# Patient Record
Sex: FEMALE | Race: WHITE | URBAN - METROPOLITAN AREA
[De-identification: names, ages, dates, MRNs, and addresses within clinical notes are randomized per-mention and may not be internally consistent; named-entity substitution may affect disease eponyms.]

---

## 2017-03-08 ENCOUNTER — OFFICE VISIT (OUTPATIENT)
Dept: NEUROLOGY | Facility: CLINIC | Age: 27
End: 2017-03-08

## 2017-03-08 DIAGNOSIS — G71.11 MYOTONIC MUSCULAR DYSTROPHY (H): Primary | ICD-10-CM

## 2017-03-08 DIAGNOSIS — G71.11 MYOTONIC MUSCULAR DYSTROPHY (H): ICD-10-CM

## 2017-03-08 LAB — MISCELLANEOUS TEST: NORMAL

## 2017-03-08 NOTE — LETTER
3/8/2017       RE: Reny Christianson  [address] [facility] [address]     Dear Colleague,    Thank you for referring your patient, Reny Christianson, to the [facility] NEUROLOGY at [facility]. Please see a copy of my visit note below.    MDA GENETIC COUNSELING CONSULT  Reny was seen with her sister and mom for a genetic counseling consult at the request of Dr. Dumas to discuss genetic testing for myotonic dystrophy. 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    FAMILY HISTORY  A detailed family history was taken and scanned into Reny s medical record. Reny's family history is signifainct for:    Reny mother, maternal grandmother and mater aunt are all diagnosed with myotonic dystrophy.    Ethnic background is [place].    GENETIC COUNSELING  1. Myotonic dystrophy  is a condition that causes muscle weakness and myotonia (an inability of the muscles to relax). Other symptoms include difficulties swallowing, cataracts, cardiac conduction defects (heart racing or skipping a beat), stomach problems, diabetes, and some endocrine issues (such as male infertility). It is recommended that individuals with myotonic dystrophy have yearly diabetes and heart screening. The recommended heart screening is called an ECG, which is a test that looks for abnormal heart rhythms. Additionally, individuals with DM2 are advised to discuss their diagnosis with their doctor before having general anesthesia. We discussed the similarities and differences between myotonic dystrophy type 1 (DM1) and type 2 (DM2). Congenital myotonic dystrophy is ONLY associated with myotonic dystrophy type 1.  2. We reviewed that the 2 types of myotonic dystrophy differ by genetic cause.  Myotonic dystrophy type 1 (DM1) is caused by a CTG expansion in the DMPK gene located on chromosome 17.  Myotonic dystrophy type 2 (DM2) is caused by CCTG expansion in the ZNF9 gene located on  chromosome 3.  3. We discussed that myotonic dystrophy  is an autosomal dominant condition. Individuals that are affected with myotonic dystrophy have a one out of two of 50% chance of passing the gene or mutation change or mutation on to their children. The symptoms can vary from individual to individual therefore not everyone who inherits an myotonic dystrophy expansion has the same symptoms or age of onset.  4. Genetic Testing involves measuring the size of the repeat expansion. This test is essentially 100% accurate in determining if an individual has either a DM1 or DM2 expansion. We discussed that determining the expansion size will not tell us age of onset or severity of symptoms. It will allow us to confirm the diagnosis and allow for testing in other family members. We reviewed the plan for testing which includes first testing for myotonic dystrophy type 1 (DM1) .  5. Because a mutation has been identified in your family pre-symptomatic testing is an option.    The decision to have pre-symptomatic testing is a personal one.  People pursue testing for several different reasons such as life planning, family planning, reassurance and knowledge.  Individuals who are undergoing genetic testing sometimes have concerns about future insurability.  We discussed that there are national and state laws that protect against genetic discrimination in regards to health insurance.  Disability, long-term care and life insurance are not protected by similar laws.  In general presymptomatic testing is recommended to be done on adults or children that would benefit from preventative screening or treatment.  Regardless of if your family members elect to have testing, it is recommended that individuals at risk to develop symptoms of DM1 have regular ECGs.  6. All immediate questions were answered. My contact information was provided for additional questions in the future.  Sixty minutes was spent with the patient and more than  50% of the time was spent in counseling and coordination of care.     PLAN  1. Reny elected to proceed with  had her blood drawn on March 8, 2017  for myotonic dystrophy type 1 testing to be performed at [facility].  2. I will contact Reny with results, which I anticipate will take 4-6 weeks.    Giulia Park MS, [facility]  Genetic Counselor  Phone: [phone]      Again, thank you for allowing me to participate in the care of your patient.      Sincerely,    Giulia Park, GC

## 2017-03-08 NOTE — LETTER
Date:March 14, 2017      Patient was self referred, no letter generated. Do not send.        [facility] Health Information

## 2017-03-08 NOTE — PROGRESS NOTES
MDA GENETIC COUNSELING CONSULT  Reny was seen with her sister and mom for a genetic counseling consult at the request of Dr. Dumas to discuss genetic testing for myotonic dystrophy. 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    FAMILY HISTORY  A detailed family history was taken and scanned into Reny s medical record. Reny's family history is signifainct for:    Reny mother, maternal grandmother and mater aunt are all diagnosed with myotonic dystrophy.    Ethnic background is [place]og[place].    GENETIC COUNSELING  1. Myotonic dystrophy  is a condition that causes muscle weakness and myotonia (an inability of the muscles to relax). Other symptoms include difficulties swallowing, cataracts, cardiac conduction defects (heart racing or skipping a beat), stomach problems, diabetes, and some endocrine issues (such as male infertility). It is recommended that individuals with myotonic dystrophy have yearly diabetes and heart screening. The recommended heart screening is called an ECG, which is a test that looks for abnormal heart rhythms. Additionally, individuals with DM2 are advised to discuss their diagnosis with their doctor before having general anesthesia. We discussed the similarities and differences between myotonic dystrophy type 1 (DM1) and type 2 (DM2). Congenital myotonic dystrophy is ONLY associated with myotonic dystrophy type 1.  2. We reviewed that the 2 types of myotonic dystrophy differ by genetic cause.  Myotonic dystrophy type 1 (DM1) is caused by a CTG expansion in the DMPK gene located on chromosome 17.  Myotonic dystrophy type 2 (DM2) is caused by CCTG expansion in the ZNF9 gene located on chromosome 3.  3. We discussed that myotonic dystrophy  is an autosomal dominant condition. Individuals that are affected with myotonic dystrophy have a one out of two of 50% chance of passing the gene or mutation change or mutation on to their children. The symptoms can vary from  individual to individual therefore not everyone who inherits an myotonic dystrophy expansion has the same symptoms or age of onset.  4. Genetic Testing involves measuring the size of the repeat expansion. This test is essentially 100% accurate in determining if an individual has either a DM1 or DM2 expansion. We discussed that determining the expansion size will not tell us age of onset or severity of symptoms. It will allow us to confirm the diagnosis and allow for testing in other family members. We reviewed the plan for testing which includes first testing for myotonic dystrophy type 1 (DM1) .  5. Because a mutation has been identified in your family pre-symptomatic testing is an option.    The decision to have pre-symptomatic testing is a personal one.  People pursue testing for several different reasons such as life planning, family planning, reassurance and knowledge.  Individuals who are undergoing genetic testing sometimes have concerns about future insurability.  We discussed that there are national and state laws that protect against genetic discrimination in regards to health insurance.  Disability, long-term care and life insurance are not protected by similar laws.  In general presymptomatic testing is recommended to be done on adults or children that would benefit from preventative screening or treatment.  Regardless of if your family members elect to have testing, it is recommended that individuals at risk to develop symptoms of DM1 have regular ECGs.  6. All immediate questions were answered. My contact information was provided for additional questions in the future.  Sixty minutes was spent with the patient and more than 50% of the time was spent in counseling and coordination of care.     PLAN  1. Reny elected to proceed with  had her blood drawn on March 8, 2017  for myotonic dystrophy type 1 testing to be performed at [facility].  2. I will contact Reny with results, which I anticipate will take  4-6 weeks.    Giulia Park MS, [facility]  Genetic Counselor  Phone: [phone]

## 2017-03-08 NOTE — MR AVS SNAPSHOT
After Visit Summary   3/8/2017    Reny Christianson    MRN: [id number]           Patient Information     Date Of Birth          1990        Visit Information        Provider Department      3/8/2017 11:00 AM Giulia Park GC [facility] Neurology        Today's Diagnoses     Myotonic muscular dystrophy (H)    -  1       Follow-ups after your visit        Your next 10 appointments already scheduled     Jul 24, 2017  3:00 PM CDT   (Arrive by 2:45 PM)   New Muscular Dystrophy with Mick Ball MD   [facility]    [address] [place] [id number] [phone]            Jul 24, 2017  4:00 PM CDT   (Arrive by 3:45 PM)   New Muscular Dystrophy with Kevin Dumas MD   [facility] Neurology ([facility])    [address] [place] [id number] [phone]              Who to contact     Please call your clinic at [phone] to:    Ask questions about your health    Make or cancel appointments    Discuss your medicines    Learn about your test results    Speak to your doctor   If you have compliments or concerns about an experience at your clinic, or if you wish to file a complaint, please contact [facility] Patient Relations at [phone] or email us at [email or web address]         Additional Information About Your Visit        MyChart Information     [employer] gives you secure access to your electronic health record. If you see a primary care provider, you can also send messages to your care team and make appointments. If you have questions, please call your primary care clinic.  If you do not have a primary care provider, please call [phone] and they will assist you.      [employer] is an electronic gateway that provides easy, online access to your medical records. With [employer], you can request a clinic appointment, read  your test results, renew a prescription or communicate with your care team.     To access your existing account, please contact your [facility] or call [phone] for assistance.        Care EveryWhere ID     This is your Care EveryWhere ID. This could be used by other organizations to access your [place] medical records  [id number]         Blood Pressure from Last 3 Encounters:   No data found for BP    Weight from Last 3 Encounters:   No data found for Wt               Primary Care Provider    None Specified       No primary provider on file.        Thank you!     Thank you for choosing [facility] NEUROLOGY  for your care. Our goal is always to provide you with excellent care. Hearing back from our patients is one way we can continue to improve our services. Please take a few minutes to complete the written survey that you may receive in the mail after your visit with us. Thank you!             Your Updated Medication List - Protect others around you: Learn how to safely use, store and throw away your medicines at www.disposemymeds.org.      Notice  As of 3/8/2017  1:04 PM    You have not been prescribed any medications.

## 2017-03-20 ENCOUNTER — TELEPHONE (OUTPATIENT)
Dept: NEUROLOGY | Facility: CLINIC | Age: 27
End: 2017-03-20

## 2017-03-20 LAB — LAB SCANNED RESULT: NORMAL

## 2017-03-20 NOTE — Clinical Note
Please print and enclose scanned image under linked document in Send outs misc test [[id number]] (Order [id number]

## 2017-03-20 NOTE — LETTER
March 20, 2017       TO: Reny Christianson  [address] [facility] [address]       DearMs.Sammy,    I am writing to follow-up on our telephone conversation on March 20, 2017 regarding your genetic test results for myotonic dystrophy type 1 (DM1).  As we discussed this confirms the diagnosis of DM1.  Include his letter is a summary of what we discussed at your appointment and on the phone as well as a copy of your results.     SUMMARY OF GENETIC TESTING RESULTS  You had your blood drawn on March 8, 2017 for myotonic dystrophy type 1 (DM1) genetic testing.  This testing detected the CTG expansion associated with myotonic dystrophy type 1 (DM1).  Specifically, the size of the expansion was estimated to be 7167-2848 CTG repeats (normal allele: 11 CTG repeats).  This confirms the diagnosis of myotonic dystrophy type 1 (DM1).    The gene that is involved in DM1 has a CTG repeat.  If the repeat is larger than 100 CTG repeats we expect individuals to display some symptoms of DM1.       SYMPTOMS OF MYOTONIC DYSTROPHY  Myotonic dystrophy is a condition that causes muscle weakness and myotonia (inability of muscles to relax after use).  Myotonic dystrophy is a multi-systemic disease meaning that besides the muscle it affects several different organs is the body.  Individuals with myotonic dystrophy can have cataracts, stomach problems, diabetes, thyroid problems and heart problems.  It is recommended that an individual with a myotonic dystrophy expansion have a yearly EKG, which is a test used to monitor the heart.  Additionally, individuals with a myotonic dystrophy expansion are advised to discuss their diagnosis with their Doctor before having general anesthesia.     There is a more severe form of myotonic dystrophy type 1, called congenital myotonic dystrophy type 1.  In congenital myotonic dystrophy type 1 symptoms are present at birth.  Babies with congenital myotonic dystrophy type 1 typically have very  little muscle tone and are sometimes described as floppy.  Because the muscles are not well developed these babies typically have difficulty feeding and breathing on their own.  Infants that survive past the first few year of life typically have progressive muscle weakness and some degree of learning difficulties or mental retardation.     GENETICS OF MYOTONIC DYSTROPHY TYPE 1  It is generally thought the larger the repeat the more severe the condition, including an earlier age when symptoms start appearing.  This being said the expansion size does not provide much prognostic information.  The repeat is very unstable and may be different sizes in different tissues.  The only repeat size we measure routinely is in the blood.  The size of repeats can become larger when they are passed on to children, and so each generation can show earlier and earlier ages of onset.  Because the size of the repeat becomes larger so can the severity of the condition.   We also discussed that the size of the repeat tends is not as likely to expand when passed on by the father, however there are reports of congenital DM1 inherited from the father.  Most infants with congenital DM1 have greater 1000 repeats.      INHERITANCE OF MYOTONIC DYSTROPHY TYPE 1 (DM1)  DM1 is an autosomal dominant condition.  Autosomal means that gene is not located on the sex chromosomes; therefore both male and females can develop symptoms.  Dominant means that you only need one changed gene to have DM1.  Individuals that have a DM1 expansion have 1 out of 2 or 50 percent chance of passing the gene change on to their children. Because you posses the DM1 CTG expansion, there is 1 out of 2 (50%) chance, in each pregnancy, to pass on the CTG expansion.      PRE-SYMPTOMATIC GENETIC TESTING FOR MYOTONIC DYSTROPHY TYPE 1 (DM1)  Because a DM1 expansion has been identified in your family pre-symptomatic testing is an option.    The decision to have pre-symptomatic  testing is a personal one.  People pursue testing for several different reasons such as life planning, family planning, reassurance and knowledge.  Individuals who are undergoing genetic testing sometimes have concerns about future insurability.  We discussed that there are national and state laws that protect against genetic discrimination in regards to health insurance.  Disability and life insurance are not protected by similar laws.      In general presymptomatic testing is recommended to be done on adults or children that would benefit from preventative screening or treatment.  Regardless of if your family members elect to have testing, it is recommended that individuals at risk to develop symptoms of have cardiac screening for cardiac conduction defects.      It has been a pleasure being involved in your care.  If I can be helpful please don't hesitate to contact me.    Sincerely,    Giulia Park MS, [facility]  Genetic Counselor  Phone: [phone]          Resulted Orders   Myotonic dystrophy type 1: Laboratory Miscellaneous Order   Result Value Ref Range    Miscellaneous Test       Specimen Received, Reordered and sent to Performing laboratory - Report to   follow upon completion.     Send outs misc test   Result Value Ref Range    Lab Scanned Result SEND OUTS MISC TEST-Scanned        Enc: scanned image of Send outs misc test [[id number]] (Order [id number]

## 2017-03-20 NOTE — TELEPHONE ENCOUNTER
Called to discuss results confirming diagnosis of myotonic dystrophy type 1 (DM1). A results letter will be mailed (see letters).    Giulia Park MS, [facility]  Genetic Counselor  Phone: [phone]

## 2017-07-10 ASSESSMENT — ENCOUNTER SYMPTOMS
INCREASED ENERGY: 0
NIGHT SWEATS: 0
STIFFNESS: 0
CHILLS: 0
WEIGHT LOSS: 0
DECREASED APPETITE: 0
HALLUCINATIONS: 0
FEVER: 0
MUSCLE WEAKNESS: 1
MUSCLE CRAMPS: 1
WEIGHT GAIN: 1
ALTERED TEMPERATURE REGULATION: 0
BACK PAIN: 0
POLYDIPSIA: 0
POLYPHAGIA: 1
JOINT SWELLING: 0
MYALGIAS: 1
FATIGUE: 1
NECK PAIN: 0
ARTHRALGIAS: 1

## 2017-07-17 ENCOUNTER — PRE VISIT (OUTPATIENT)
Dept: NEUROLOGY | Facility: CLINIC | Age: 27
End: 2017-07-17

## 2017-07-17 NOTE — TELEPHONE ENCOUNTER
1.  Date/reason for appt:7/24/17, Muscular Dystrophy   2.  Referring provider:PAM DIAZ  3.  Call to patient (Yes / No - short description):No, referred   4.  Previous care at / records requested from:   [facility]  5.  Other:

## 2017-07-18 ENCOUNTER — PRE VISIT (OUTPATIENT)
Dept: CARDIOLOGY | Facility: CLINIC | Age: 27
End: 2017-07-18

## 2017-07-18 DIAGNOSIS — G71.11 MYOTONIC DYSTROPHY, TYPE 1 (H): Primary | ICD-10-CM

## 2017-07-18 DIAGNOSIS — R00.2 PALPITATIONS: ICD-10-CM

## 2017-07-24 ENCOUNTER — RESEARCH ENCOUNTER (OUTPATIENT)
Dept: NEUROLOGY | Facility: CLINIC | Age: 27
End: 2017-07-24

## 2017-07-24 ENCOUNTER — OFFICE VISIT (OUTPATIENT)
Dept: NEUROLOGY | Facility: CLINIC | Age: 27
End: 2017-07-24

## 2017-07-24 ENCOUNTER — HOSPITAL ENCOUNTER (OUTPATIENT)
Dept: MRI IMAGING | Facility: CLINIC | Age: 27
Discharge: HOME | End: 2017-07-24
Attending: INTERNAL MEDICINE | Admitting: INTERNAL MEDICINE
Payer: MEDICARE

## 2017-07-24 ENCOUNTER — OFFICE VISIT (OUTPATIENT)
Dept: CARDIOLOGY | Facility: CLINIC | Age: 27
End: 2017-07-24
Attending: INTERNAL MEDICINE
Payer: MEDICARE

## 2017-07-24 VITALS
OXYGEN SATURATION: 95 % | DIASTOLIC BLOOD PRESSURE: 81 MMHG | WEIGHT: 159.7 LBS | SYSTOLIC BLOOD PRESSURE: 118 MMHG | HEART RATE: 70 BPM | HEIGHT: 62 IN | BODY MASS INDEX: 29.39 KG/M2

## 2017-07-24 VITALS
WEIGHT: 159 LBS | HEART RATE: 70 BPM | DIASTOLIC BLOOD PRESSURE: 81 MMHG | BODY MASS INDEX: 29.26 KG/M2 | SYSTOLIC BLOOD PRESSURE: 118 MMHG | HEIGHT: 62 IN

## 2017-07-24 DIAGNOSIS — R00.2 PALPITATIONS: ICD-10-CM

## 2017-07-24 DIAGNOSIS — G71.11 MYOTONIC MUSCULAR DYSTROPHY (H): Primary | ICD-10-CM

## 2017-07-24 DIAGNOSIS — G71.11 MYOTONIC DYSTROPHY, TYPE 1 (H): ICD-10-CM

## 2017-07-24 LAB
ANION GAP SERPL CALCULATED.3IONS-SCNC: 9 MMOL/L (ref 3–14)
BUN SERPL-MCNC: 10 MG/DL (ref 7–30)
CALCIUM SERPL-MCNC: 9.8 MG/DL (ref 8.5–10.1)
CHLORIDE SERPL-SCNC: 112 MMOL/L (ref 94–109)
CO2 SERPL-SCNC: 23 MMOL/L (ref 20–32)
CREAT SERPL-MCNC: 0.62 MG/DL (ref 0.52–1.04)
CRP SERPL-MCNC: <2.9 MG/L (ref 0–8)
ERYTHROCYTE [DISTWIDTH] IN BLOOD BY AUTOMATED COUNT: 13.7 % (ref 10–15)
GFR SERPL CREATININE-BSD FRML MDRD: ABNORMAL ML/MIN/1.7M2
GLUCOSE SERPL-MCNC: 79 MG/DL (ref 70–99)
HCT VFR BLD AUTO: 43 % (ref 35–47)
HGB BLD-MCNC: 14.1 G/DL (ref 11.7–15.7)
MCH RBC QN AUTO: 30.6 PG (ref 26.5–33)
MCHC RBC AUTO-ENTMCNC: 32.8 G/DL (ref 31.5–36.5)
MCV RBC AUTO: 93 FL (ref 78–100)
PLATELET # BLD AUTO: 127 10E9/L (ref 150–450)
POTASSIUM SERPL-SCNC: 4.2 MMOL/L (ref 3.4–5.3)
RBC # BLD AUTO: 4.61 10E12/L (ref 3.8–5.2)
SODIUM SERPL-SCNC: 144 MMOL/L (ref 133–144)
TROPONIN I SERPL-MCNC: NORMAL UG/L (ref 0–0.04)
WBC # BLD AUTO: 4.6 10E9/L (ref 4–11)

## 2017-07-24 PROCEDURE — 75561 CARDIAC MRI FOR MORPH W/DYE: CPT

## 2017-07-24 PROCEDURE — 75561 CARDIAC MRI FOR MORPH W/DYE: CPT | Mod: 26 | Performed by: INTERNAL MEDICINE

## 2017-07-24 PROCEDURE — 93005 ELECTROCARDIOGRAM TRACING: CPT | Mod: ZF

## 2017-07-24 PROCEDURE — 84484 ASSAY OF TROPONIN QUANT: CPT | Performed by: INTERNAL MEDICINE

## 2017-07-24 PROCEDURE — 99212 OFFICE O/P EST SF 10 MIN: CPT | Mod: 25,ZF

## 2017-07-24 PROCEDURE — 0298T ZZC EXT ECG > 48HR TO 21 DAY REVIEW AND INTERPRETATN: CPT | Performed by: INTERNAL MEDICINE

## 2017-07-24 PROCEDURE — 80048 BASIC METABOLIC PNL TOTAL CA: CPT | Performed by: INTERNAL MEDICINE

## 2017-07-24 PROCEDURE — 0296T ZIO PATCH HOLTER: CPT | Mod: ZF | Performed by: INTERNAL MEDICINE

## 2017-07-24 PROCEDURE — 86140 C-REACTIVE PROTEIN: CPT | Performed by: INTERNAL MEDICINE

## 2017-07-24 PROCEDURE — 99202 OFFICE O/P NEW SF 15 MIN: CPT | Mod: ZP | Performed by: INTERNAL MEDICINE

## 2017-07-24 PROCEDURE — 93010 ELECTROCARDIOGRAM REPORT: CPT | Mod: ZP | Performed by: INTERNAL MEDICINE

## 2017-07-24 PROCEDURE — A9585 GADOBUTROL INJECTION: HCPCS | Performed by: INTERNAL MEDICINE

## 2017-07-24 PROCEDURE — 25000128 H RX IP 250 OP 636: Performed by: INTERNAL MEDICINE

## 2017-07-24 PROCEDURE — 85027 COMPLETE CBC AUTOMATED: CPT | Performed by: INTERNAL MEDICINE

## 2017-07-24 RX ORDER — GADOBUTROL 604.72 MG/ML
10 INJECTION INTRAVENOUS ONCE
Status: COMPLETED | OUTPATIENT
Start: 2017-07-24 | End: 2017-07-24

## 2017-07-24 RX ORDER — MULTIPLE VITAMINS W/ MINERALS TAB 9MG-400MCG
1 TAB ORAL DAILY
COMMUNITY

## 2017-07-24 RX ORDER — HYDROCODONE BITARTRATE AND ACETAMINOPHEN 5; 325 MG/1; MG/1
1 TABLET ORAL EVERY 6 HOURS PRN
COMMUNITY
End: 2019-07-22

## 2017-07-24 RX ADMIN — GADOBUTROL 10 ML: 604.72 INJECTION INTRAVENOUS at 13:39

## 2017-07-24 ASSESSMENT — PAIN SCALES - GENERAL
PAINLEVEL: NO PAIN (0)
PAINLEVEL: NO PAIN (0)

## 2017-07-24 NOTE — PATIENT INSTRUCTIONS
Wear ZIO patch monitor for 7 days, send back to company when done  We will call you with results from today's MRI.  Follow up with Dr. Clemente Ball in one yr. Or sooner if needed.      Gabriella Izaguirre, RN  Cardiology Care Coordinator  Please be aware that I work part-time but I will be checking messages several times per week.   For urgent needs, please call the number below.    [phone], press 1 for [employer], press 3 to speak to a nurse    .

## 2017-07-24 NOTE — NURSING NOTE
Chief Complaint   Patient presents with     Consult     Muscular Dystrophy    Bryn Orlando, CHOLO

## 2017-07-24 NOTE — MR AVS SNAPSHOT
After Visit Summary   7/24/2017    Reny Christianson    MRN: [id number]           Patient Information     Date Of Birth          1990        Visit Information        Provider Department      7/24/2017 4:00 PM Kevin Dumas MD [facility] Neurology        Today's Diagnoses     Myotonic muscular dystrophy (H)    -  1       Follow-ups after your visit        Additional Services     ORTHOTICS REFERRAL       Shoe inserts bilateral            ORTHOTICS REFERRAL           PT Referral (External Facility)       Evaluate and treat.                  Who to contact     Please call your clinic at [phone] to:    Ask questions about your health    Make or cancel appointments    Discuss your medicines    Learn about your test results    Speak to your doctor   If you have compliments or concerns about an experience at your clinic, or if you wish to file a complaint, please contact [facility] Patient Relations at [phone] or email us at [email or web address]         Additional Information About Your Visit        MyChart Information     [employer] gives you secure access to your electronic health record. If you see a primary care provider, you can also send messages to your care team and make appointments. If you have questions, please call your primary care clinic.  If you do not have a primary care provider, please call [phone] and they will assist you.      [employer] is an electronic gateway that provides easy, online access to your medical records. With [employer], you can request a clinic appointment, read your test results, renew a prescription or communicate with your care team.     To access your existing account, please contact your [facility] or call [phone] for assistance.        Care EveryWhere ID     This is your Care EveryWhere ID. This could be used by other organizations to access your [facility]  "records  [id number]        Your Vitals Were     Pulse Height BMI (Body Mass Index)             70 5' 2\" (157.5 cm) 29.08 kg/m2          Blood Pressure from Last 3 Encounters:   07/24/17 118/81   07/24/17 118/81    Weight from Last 3 Encounters:   07/24/17 159 lb (72.1 kg)   07/24/17 159 lb 11.2 oz (72.4 kg)              We Performed the Following     ORTHOTICS REFERRAL     ORTHOTICS REFERRAL     PT Referral (External Facility)        Primary Care Provider    None Specified       No primary provider on file.        Equal Access to Services     [facility]: Hadii benito Bravo, kiana ingram, fiona doan, rohan marks . So [facility] [phone].    ATENCIÓN: Si habla español, tiene a saunders disposición servicios gratuitos de asistencia lingüística. [facility] [phone].    We comply with applicable federal civil rights laws and [place] laws. We do not discriminate on the basis of race, color, national origin, age, disability sex, sexual orientation or gender identity.            Thank you!     Thank you for choosing [facility] NEUROLOGY  for your care. Our goal is always to provide you with excellent care. Hearing back from our patients is one way we can continue to improve our services. Please take a few minutes to complete the written survey that you may receive in the mail after your visit with us. Thank you!             Your Updated Medication List - Protect others around you: Learn how to safely use, store and throw away your medicines at www.disposemymeds.org.          This list is accurate as of: 7/24/17 11:59 PM.  Always use your most recent med list.                   Brand Name Dispense Instructions for use Diagnosis    calcium-vitamin D 600-400 MG-UNIT per tablet    CALTRATE     Take 1 tablet by mouth 2 times daily        HYDROcodone-acetaminophen 5-325 MG per tablet    NORCO     Take 1 tablet by mouth every 6 hours as needed for moderate to severe pain     "    Multi-vitamin Tabs tablet      Take 1 tablet by mouth daily Women's Health Vitamin        VITAMIN C PO      Take 500 mg by mouth 2 times daily

## 2017-07-24 NOTE — PROGRESS NOTES
[facility] CLINIC NOTE    Reny Christianson MRN# [id number]  Age: 27 year old YOB: 1990    Reason for consult: Establish care    History of Present Illness:  Ms. Christianson is a 27 year old female who is referred to us for evaluation of previously diagnosed myotonic dystrophy type 1. 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 She has been running stable stable course from a motor stand point as well as cognitively.  Sleeps from 9 till 7. She does take naps periodically. Bulbar function is good. She does not fall. She has history of colon cancer and pseudoobstructio but does not have significant gastrointestinal symptoms. Her myotonia is relatively moderate.    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    Past Surgical History:  No past surgical history on file.    Family History:  No family history on file.    Social History:  Social History   Substance Use Topics     Smoking status: Not on file     Smokeless tobacco: Not on file     Alcohol use Not on file     Works in [employer] - 20-25 hrs    Current Medications:  Current Outpatient Prescriptions   Medication Sig Dispense Refill     calcium-vitamin D (CALTRATE) 600-400 MG-UNIT per tablet Take 1 tablet by mouth 2 times daily       Ascorbic Acid (VITAMIN C PO) Take 500 mg by mouth 2 times daily       multivitamin, therapeutic with minerals (MULTI-VITAMIN) TABS tablet Take 1 tablet by mouth daily Women's Health Vitamin       HYDROcodone-acetaminophen (NORCO) 5-325 MG per tablet Take 1 tablet by mouth every 6 hours as needed for moderate to severe pain         Review of Systems:  Constitutional:   Well nourished, Well developed, Chills/sweats/fever and Difficulty sleeping  Neurological:   As in HPI,  finished high school  Eyes/Ears:   Evolving cataracts no surgery yet.  Cardiovascular:   negative  Respiratory:   negative  Gastrointestinal:   As in HPI  Genitourinary:   Incontinence and occasional  Musculoskeletal: occasional knee pain  Integument:   no skin changesnegative  Hematologic:   negative  Endocrine:   negative  Psychiatric:   negative  Answers for HPI/ROS submitted by the patient on 7/10/2017   General Symptoms: Yes  Skin Symptoms: No  HENT Symptoms: No  EYE SYMPTOMS: No  HEART SYMPTOMS: No  LUNG SYMPTOMS: No  INTESTINAL SYMPTOMS: No  URINARY SYMPTOMS: No  GYNECOLOGIC SYMPTOMS: No  BREAST SYMPTOMS: No  SKELETAL SYMPTOMS: Yes  BLOOD SYMPTOMS: No  NERVOUS SYSTEM SYMPTOMS: No  MENTAL HEALTH SYMPTOMS: No  Fever: No  Loss of appetite: No  Weight loss: No  Weight gain: Yes  Fatigue: Yes  Night sweats: No  Chills: No  Increased stress: No  Excessive hunger: Yes  Excessive thirst: No  Feeling hot or cold when others believe the temperature is normal: No  Loss of height: No  Post-operative complications: No  Surgical site pain: Yes  Hallucinations: No  Change in or Loss of Energy: No  Hyperactivity: No  Confusion: No  Back pain: No  Muscle aches: Yes  Neck pain: No  Swollen joints: No  Joint pain: Yes  Bone pain: No  Muscle cramps: Yes  Muscle weakness: Yes  Joint stiffness: No  Bone fracture: No    Physical Exam:    Funduscopic Exam: normal  Appearance: Comfortable and relaxed  LOC and Cognition:  Normal:   Alert and oriented to time, person and place for age, Appropriate and fluent speech for age, Follows commands appropriately for age and Affect pleasant, behavior cooperative    Cranial Nerves:  Partial II, III/IV/VI, V, VII, VIII, IX/X, XI, XII Normal:   Pupils 3 mm react briskly and appear symmetric, vision grossly intact, peripheral fields intact, extraocular movements conjugate and full, bilateral mild ptosis, facial sensation and muscle weakness which is symmetric, swallow and voice quality  unremarkable, hearing grossly intact, shoulders shrugs strong and symmetric, and tongue midline.      Motor:  Strength in upper and lower extremities was 5/5 for bilateral deltoids, biceps, triceps, wrist flexors, wrist extensors, hand intrinsics, bilateral hip flexors, knee extensors, knee flexors, dorsiflexion and plantar flexion with exception of mild weakness in neck felxion and weakness in the deep finger felxors.      Coordination:  Normal:   Coordination testing:  finger to nose and heel to shin tests were intact and symmetric    Deep Tendon Reflexes:  Normal:  Deep tendon reflexes were 2+ and symmetric for biceps, triceps, brachioradialis, patellae and Achilles  Nguyễn's negative  No ankle clonus  Bilateral response to plantar stimulation, downgoing toes    Sensation:  Light touch and pinprick normal:  Sensation to light touch and pinprick intact and symmetric in upper and lower extremities  Vibration normal:   Vibration intact and symmetric in upper and lower extremities        Assessment and Plan:  Ms. Christianson presents with childhood onset of myotonic dystrophy type 1 with mild motor dysfunction and moderate cognitive disorder. She is stable and well adapted. Her course was complicated by colon cancer which is under control s/p chemo and resection.    Continue current course.  PT evaluation.  Cardiology eval.  PFT next visit in 1 year.   consult.    Amount of time performed on this consult: 45 minutes.    Kevin Dumas MD

## 2017-07-24 NOTE — MR AVS SNAPSHOT
After Visit Summary   7/24/2017    Reny Christianson    MRN: [id number]           Patient Information     Date Of Birth          1990        Visit Information        Provider Department      7/24/2017 3:00 PM Mick Ball MD [facility] Heart Care        Today's Diagnoses     Myotonic dystrophy, type 1 (H)        Palpitations          Care Instructions    Wear ZIO patch monitor for 7 days, send back to company when done  We will call you with results from today's MRI.  Follow up with Dr. Clemente Ball in one yr. Or sooner if needed.      Gabriella Izaguirre, RN  Cardiology Care Coordinator  Please be aware that I work part-time but I will be checking messages several times per week.   For urgent needs, please call the number below.    [phone], press 1 for [employer], press 3 to speak to a nurse    .          Follow-ups after your visit        Follow-up notes from your care team     Return in about 1 year (around 7/24/2018) for Physical Exam, Lab Work.      Your next 10 appointments already scheduled     Jul 24, 2017  4:00 PM CDT   (Arrive by 3:45 PM)   New Muscular Dystrophy with Kevin Dumas MD   [facility] Neurology ([facility])    [address] [place] [address] [id number] [phone]            Jul 24, 2017  5:00 PM CDT   PFT VISIT with  PFL DENISE   [facility] Pulmonary Function Testing ([facility])    [address] [place] [address] [id number] [phone]              Future tests that were ordered for you today     Open Future Orders        Priority Expected Expires Ordered    Ziopatch Holter Monitor - Adult Routine 7/24/2017 9/22/2017 7/24/2017    PFT General Lab Testing Routine 7/20/2017 7/19/2018 7/19/2017            Who to contact     If you have questions or need follow up information about today's clinic visit or your schedule please contact [facility] directly at  "[phone].  Normal or non-critical lab and imaging results will be communicated to you by MyChart, letter or phone within 4 business days after the clinic has received the results. If you do not hear from us within 7 days, please contact the clinic through shipbeathart or phone. If you have a critical or abnormal lab result, we will notify you by phone as soon as possible.  Submit refill requests through [employer] or call your pharmacy and they will forward the refill request to us. Please allow 3 business days for your refill to be completed.          Additional Information About Your Visit        shipbeathart Information     [employer] gives you secure access to your electronic health record. If you see a primary care provider, you can also send messages to your care team and make appointments. If you have questions, please call your primary care clinic.  If you do not have a primary care provider, please call [phone] and they will assist you.        Care EveryWhere ID     This is your Care EveryWhere ID. This could be used by other organizations to access your [facility] medical records  [id number]        Your Vitals Were     Pulse Height Pulse Oximetry BMI (Body Mass Index)          70 1.575 m (5' 2\") 95% 29.21 kg/m2         Blood Pressure from Last 3 Encounters:   07/24/17 118/81    Weight from Last 3 Encounters:   07/24/17 72.4 kg (159 lb 11.2 oz)              We Performed the Following     EKG 12-lead, tracing only (Future)        Primary Care Provider    None Specified       No primary provider on file.        Equal Access to Services     ADMION PALAFOX : Hadii benito Bravo, wamignonda juan jose, qaybta kaalmada franki, rohan marks . So [facility] [phone].    ATENCIÓN: Si habla español, tiene a saunders disposición servicios gratuitos de asistencia lingüística. Llame al [phone].    We comply with applicable federal civil rights laws and [place] laws. We do not discriminate on the " basis of race, color, national origin, age, disability sex, sexual orientation or gender identity.            Thank you!     Thank you for choosing [facility]  for your care. Our goal is always to provide you with excellent care. Hearing back from our patients is one way we can continue to improve our services. Please take a few minutes to complete the written survey that you may receive in the mail after your visit with us. Thank you!             Your Updated Medication List - Protect others around you: Learn how to safely use, store and throw away your medicines at www.disposemymeds.org.          This list is accurate as of: 7/24/17  3:35 PM.  Always use your most recent med list.                   Brand Name Dispense Instructions for use Diagnosis    calcium-vitamin D 600-400 MG-UNIT per tablet    CALTRATE     Take 1 tablet by mouth 2 times daily        HYDROcodone-acetaminophen 5-325 MG per tablet    NORCO     Take 1 tablet by mouth every 6 hours as needed for moderate to severe pain        Multi-vitamin Tabs tablet      Take 1 tablet by mouth daily Women's Health Vitamin        VITAMIN C PO      Take 500 mg by mouth 2 times daily

## 2017-07-24 NOTE — PROGRESS NOTES
HPI: This is the initial visit for this 26 yo WF with myotonic dystrophy type I (digits, dysphagia and cognitive involvement) and colon CA presents for assessmnet of cardiomyopathy.  Pt denies new CP, SOB, COLLINS, orthopnea, PND, fevers, chills, NS, joint pain.  Reports use of three pillows at night.  Denies tobacco use.      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    SOCIAL HISTORY:  Social History     Social History     Marital status: Single     Spouse name: N/A     Number of children: N/A     Years of education: N/A     Social History Main Topics     Smoking status: Not on file     Smokeless tobacco: Not on file     Alcohol use Not on file     Drug use: Not on file     Sexual activity: Not on file     Other Topics Concern     Not on file     Social History Narrative     No narrative on file       CURRENT MEDICATIONS:  Current Outpatient Prescriptions   Medication Sig Dispense Refill     calcium-vitamin D (CALTRATE) 600-400 MG-UNIT per tablet Take 1 tablet by mouth 2 times daily       Ascorbic Acid (VITAMIN C PO) Take 500 mg by mouth 2 times daily       multivitamin, therapeutic with minerals (MULTI-VITAMIN) TABS tablet Take 1 tablet by mouth daily Women's Health Vitamin       HYDROcodone-acetaminophen (NORCO) 5-325 MG per tablet Take 1 tablet by mouth every 6 hours as needed for moderate to severe pain         ROS:   Constitutional: No fever, chills, or sweats. No weight gain/loss.   ENT: No visual disturbance, ear ache, epistaxis, sore throat.   Allergies/Immunologic: Negative.   Respiratory: No cough, hemoptysis.   Cardiovascular: As per HPI.   GI: No nausea, vomiting, hematemesis, melena, or hematochezia.   : No urinary frequency, dysuria, or hematuria.   Integument: Negative.   Psychiatric: Negative.   Neuro: Negative.   Endocrinology: Negative.   Musculoskeletal: Negative.    EXAM:  /81  "(BP Location: Left arm, Patient Position: Chair, Cuff Size: Adult Regular)  Pulse 70  Ht 1.575 m (5' 2\")  Wt 72.4 kg (159 lb 11.2 oz)  SpO2 95%  BMI 29.21 kg/m2  General: appears comfortable, alert and articulate  Head: normocephalic, atraumatic  Eyes: anicteric sclera, EOMI  Neck: no adenopathy  Orophyarynx: moist mucosa, no lesions, dentition intact  Heart: regular, S1/S2, no murmur, gallop, rub, estimated JVP 8 cm  Lungs: clear, no rales or wheezing  Abdomen: soft, non-tender, bowel sounds present, no hepatosplenomegaly  Extremities: no clubbing, cyanosis or edema  Neurological: normal speech and affect, no gross motor deficits    Labs:  CBC RESULTS:  Lab Results   Component Value Date    WBC 4.6 07/24/2017    RBC 4.61 07/24/2017    HGB 14.1 07/24/2017    HCT 43.0 07/24/2017    MCV 93 07/24/2017    MCH 30.6 07/24/2017    MCHC 32.8 07/24/2017    RDW 13.7 07/24/2017     (L) 07/24/2017       CMP RESULTS:  Lab Results   Component Value Date     07/24/2017    POTASSIUM 4.2 07/24/2017    CHLORIDE 112 (H) 07/24/2017    CO2 23 07/24/2017    ANIONGAP 9 07/24/2017    GLC 79 07/24/2017    BUN 10 07/24/2017    CR 0.62 07/24/2017    GFRESTIMATED >90  Non  GFR Calc   07/24/2017    GFRESTBLACK >90   GFR Calc   07/24/2017    SHONA 9.8 07/24/2017        INR RESULTS:  No results found for: INR    No results found for: MAG  No results found for: NTBNPI  No results found for: NTBNP    Assessment and Plan:   Pt with myotonic dystrophy type I at higher risk for cardiomyopathy.  ECG reveals NSR with poor R wave progression.  Will schedule 48 hr Holter Monitor and await formal read of CMR.  If all test results WNL will have pt return in one year.  Discussed plan with sister and mother and patient.      Mick Ball MD, PhD  Professor, Heart Failure and Cardiac Transplantation  [facility]    CC  No care team member to display  STEPHEN MEDEL    "

## 2017-07-24 NOTE — LETTER
7/24/2017      RE: Reny Christianson  [address] [place] [address]       Dear Colleague,    Thank you for the opportunity to participate in the care of your patient, Reny Christianson, at the [facility] HEART [facility]. Please see a copy of my visit note below.    HPI: This is the initial visit for this 26 yo WF with myotonic dystrophy type I (digits, dysphagia and cognitive involvement) and colon CA presents for assessmnet of cardiomyopathy.  Pt denies new CP, SOB, COLLINS, orthopnea, PND, fevers, chills, NS, joint pain.  Reports use of three pillows at night.  Denies tobacco use.      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    SOCIAL HISTORY:  Social History     Social History     Marital status: Single     Spouse name: N/A     Number of children: N/A     Years of education: N/A     Social History Main Topics     Smoking status: Not on file     Smokeless tobacco: Not on file     Alcohol use Not on file     Drug use: Not on file     Sexual activity: Not on file     Other Topics Concern     Not on file     Social History Narrative     No narrative on file       CURRENT MEDICATIONS:  Current Outpatient Prescriptions   Medication Sig Dispense Refill     calcium-vitamin D (CALTRATE) 600-400 MG-UNIT per tablet Take 1 tablet by mouth 2 times daily       Ascorbic Acid (VITAMIN C PO) Take 500 mg by mouth 2 times daily       multivitamin, therapeutic with minerals (MULTI-VITAMIN) TABS tablet Take 1 tablet by mouth daily Women's Health Vitamin       HYDROcodone-acetaminophen (NORCO) 5-325 MG per tablet Take 1 tablet by mouth every 6 hours as needed for moderate to severe pain         ROS:   Constitutional: No fever, chills, or sweats. No weight gain/loss.   ENT: No visual disturbance, ear ache, epistaxis, sore throat.   Allergies/Immunologic: Negative.  "  Respiratory: No cough, hemoptysis.   Cardiovascular: As per HPI.   GI: No nausea, vomiting, hematemesis, melena, or hematochezia.   : No urinary frequency, dysuria, or hematuria.   Integument: Negative.   Psychiatric: Negative.   Neuro: Negative.   Endocrinology: Negative.   Musculoskeletal: Negative.    EXAM:  /81 (BP Location: Left arm, Patient Position: Chair, Cuff Size: Adult Regular)  Pulse 70  Ht 1.575 m (5' 2\")  Wt 72.4 kg (159 lb 11.2 oz)  SpO2 95%  BMI 29.21 kg/m2  General: appears comfortable, alert and articulate  Head: normocephalic, atraumatic  Eyes: anicteric sclera, EOMI  Neck: no adenopathy  Orophyarynx: moist mucosa, no lesions, dentition intact  Heart: regular, S1/S2, no murmur, gallop, rub, estimated JVP 8 cm  Lungs: clear, no rales or wheezing  Abdomen: soft, non-tender, bowel sounds present, no hepatosplenomegaly  Extremities: no clubbing, cyanosis or edema  Neurological: normal speech and affect, no gross motor deficits    Labs:  CBC RESULTS:  Lab Results   Component Value Date    WBC 4.6 07/24/2017    RBC 4.61 07/24/2017    HGB 14.1 07/24/2017    HCT 43.0 07/24/2017    MCV 93 07/24/2017    MCH 30.6 07/24/2017    MCHC 32.8 07/24/2017    RDW 13.7 07/24/2017     (L) 07/24/2017       CMP RESULTS:  Lab Results   Component Value Date     07/24/2017    POTASSIUM 4.2 07/24/2017    CHLORIDE 112 (H) 07/24/2017    CO2 23 07/24/2017    ANIONGAP 9 07/24/2017    GLC 79 07/24/2017    BUN 10 07/24/2017    CR 0.62 07/24/2017    GFRESTIMATED >90  Non  GFR Calc   07/24/2017    GFRESTBLACK >90   GFR Calc   07/24/2017    SHONA 9.8 07/24/2017        INR RESULTS:  No results found for: INR    No results found for: MAG  No results found for: NTBNPI  No results found for: NTBNP    Assessment and Plan:   Pt with myotonic dystrophy type I at higher risk for cardiomyopathy.  ECG reveals NSR with poor R wave progression.  Will schedule 48 hr Holter Monitor and " await formal read of CMR.  If all test results WNL will have pt return in one year.  Discussed plan with sister and mother and patient.      Mick Ball MD, PhD  Professor, Heart Failure and Cardiac Transplantation  [facility]  No care team member to display  STEPHEN MEDEL

## 2017-07-24 NOTE — NURSING NOTE
Chief Complaint   Patient presents with     New Patient     new pt. with Mytonic dystorphy1, EKG, labs     Vitals were taken and medication were reconciled. EKG performed.    Elli Franco CMA    2:41 PM    Per Dr. Mick Ball, patient to have 7 day Zio monitor placed.  Diagnosis: Palpitations  Monitor placed: Yes  Patient Instructed: Yes  Patient verbalized understanding: Yes  Holter # [id number]  Placed by DANNI Talbot

## 2017-07-24 NOTE — PROGRESS NOTES
[facility] Neuromuscular Registry    IRB # [id number]  PI: Mick Ball MD, PhD  : Jenn Prescott    Patient was approached for possible participation for the above study. The current approved IRB consent form was discussed and explained to the patient.  It was discussed that involvement with the study is voluntary and refusal to participate would not involve penalty or decrease benefits at which the patient is entitled, and the subject may discontinue his/her involvement at any time without penalty or loss in benefits. We also discussed that this study does not have follow up visits or procedures. Patient was informed that an additional contact might occur if data needed was not found in patient s medical record. The patient was given time to review and ask any questions about the consent. Patient was shown contact information for PI and study staff in consent for future questions. Patient verbalized understanding of consent and study by restating the purpose, procedures, duration, risk, confidentiality of PHI, and voluntarily participation. Patient printed, signed and dated the consent and HIPAA form prior to study involvement. A copy was given to the patient for their records.     Subject Consent/HIPAA : SIGNED ON 7.24.2017

## 2017-07-25 LAB — INTERPRETATION ECG - MUSE: NORMAL

## 2017-08-01 ENCOUNTER — CARE COORDINATION (OUTPATIENT)
Dept: CARDIOLOGY | Facility: CLINIC | Age: 27
End: 2017-08-01

## 2017-08-01 NOTE — PROGRESS NOTES
Spoke with mother about cardiac MRI results;  Late gadolinium enhancement imaging shows no MI, fibrosis or infiltrative disease.  Will follow up with O monitor results when these are available.

## 2017-08-08 ENCOUNTER — CARE COORDINATION (OUTPATIENT)
Dept: CARDIOLOGY | Facility: CLINIC | Age: 27
End: 2017-08-08

## 2018-01-21 ENCOUNTER — HEALTH MAINTENANCE LETTER (OUTPATIENT)
Age: 28
End: 2018-01-21

## 2018-08-08 DIAGNOSIS — Z13.6 SCREENING FOR CARDIOVASCULAR CONDITION: ICD-10-CM

## 2018-08-08 DIAGNOSIS — G71.11 MYOTONIC DYSTROPHY, TYPE 1 (H): Primary | ICD-10-CM

## 2018-08-08 DIAGNOSIS — Z85.038 HISTORY OF COLON CANCER: ICD-10-CM

## 2018-08-23 ENCOUNTER — TELEPHONE (OUTPATIENT)
Dept: NEUROLOGY | Facility: CLINIC | Age: 28
End: 2018-08-23

## 2018-08-23 NOTE — TELEPHONE ENCOUNTER
[facility] Call Center    Phone Message    May a detailed message be left on voicemail: yes    Reason for Call: Other: Pt got a letter that all of her Appts were on [employer] move in day. Pt Mom cannot drive with traffic etc - Wondering if you can coordinate Appts again and get her in sooner than next year when I found openings for all of these together - Lab, Dr Ball, PFT, Dr Dumas - Please return Pt mom call - Thanks     Action Taken: Message routed to:  [facility] Neurology [facility]

## 2018-11-19 ASSESSMENT — ENCOUNTER SYMPTOMS
ARTHRALGIAS: 1
MYALGIAS: 1

## 2018-11-21 DIAGNOSIS — Z13.6 SCREENING FOR CARDIOVASCULAR CONDITION: Primary | ICD-10-CM

## 2018-11-21 DIAGNOSIS — G71.11 MYOTONIC DYSTROPHY, TYPE 1 (H): ICD-10-CM

## 2018-11-26 ENCOUNTER — THERAPY VISIT (OUTPATIENT)
Dept: PHYSICAL THERAPY | Facility: CLINIC | Age: 28
End: 2018-11-26
Payer: MEDICARE

## 2018-11-26 ENCOUNTER — OFFICE VISIT (OUTPATIENT)
Dept: CARDIOLOGY | Facility: CLINIC | Age: 28
End: 2018-11-26
Attending: INTERNAL MEDICINE
Payer: MEDICARE

## 2018-11-26 ENCOUNTER — OFFICE VISIT (OUTPATIENT)
Dept: NEUROLOGY | Facility: CLINIC | Age: 28
End: 2018-11-26
Payer: MEDICARE

## 2018-11-26 VITALS — HEART RATE: 81 BPM | SYSTOLIC BLOOD PRESSURE: 118 MMHG | OXYGEN SATURATION: 97 % | DIASTOLIC BLOOD PRESSURE: 70 MMHG

## 2018-11-26 VITALS
WEIGHT: 189 LBS | HEIGHT: 62 IN | BODY MASS INDEX: 34.78 KG/M2 | DIASTOLIC BLOOD PRESSURE: 83 MMHG | HEART RATE: 72 BPM | SYSTOLIC BLOOD PRESSURE: 125 MMHG | OXYGEN SATURATION: 96 %

## 2018-11-26 DIAGNOSIS — Z13.6 SCREENING FOR CARDIOVASCULAR CONDITION: ICD-10-CM

## 2018-11-26 DIAGNOSIS — Z78.9 IMPAIRED MOBILITY AND ADLS: Primary | ICD-10-CM

## 2018-11-26 DIAGNOSIS — Z74.09 IMPAIRED MOBILITY AND ADLS: Primary | ICD-10-CM

## 2018-11-26 DIAGNOSIS — G71.11 MYOTONIC DYSTROPHY, TYPE 1 (H): ICD-10-CM

## 2018-11-26 DIAGNOSIS — G71.11 MYOTONIC MUSCULAR DYSTROPHY (H): ICD-10-CM

## 2018-11-26 DIAGNOSIS — G71.11 MYOTONIC DYSTROPHY, TYPE 1 (H): Primary | ICD-10-CM

## 2018-11-26 DIAGNOSIS — G71.11 MYOTONIC MUSCULAR DYSTROPHY (H): Primary | ICD-10-CM

## 2018-11-26 LAB
ANION GAP SERPL CALCULATED.3IONS-SCNC: 9 MMOL/L (ref 3–14)
BUN SERPL-MCNC: 11 MG/DL (ref 7–30)
CALCIUM SERPL-MCNC: 9.2 MG/DL (ref 8.5–10.1)
CHLORIDE SERPL-SCNC: 111 MMOL/L (ref 94–109)
CO2 SERPL-SCNC: 21 MMOL/L (ref 20–32)
CREAT SERPL-MCNC: 0.63 MG/DL (ref 0.52–1.04)
GFR SERPL CREATININE-BSD FRML MDRD: >90 ML/MIN/1.7M2
GLUCOSE SERPL-MCNC: 87 MG/DL (ref 70–99)
POTASSIUM SERPL-SCNC: 4 MMOL/L (ref 3.4–5.3)
SODIUM SERPL-SCNC: 141 MMOL/L (ref 133–144)

## 2018-11-26 PROCEDURE — 93010 ELECTROCARDIOGRAM REPORT: CPT | Mod: ZP | Performed by: INTERNAL MEDICINE

## 2018-11-26 PROCEDURE — 0298T ZZC EXT ECG > 48HR TO 21 DAY REVIEW AND INTERPRETATN: CPT | Mod: ZP | Performed by: INTERNAL MEDICINE

## 2018-11-26 PROCEDURE — 99212 OFFICE O/P EST SF 10 MIN: CPT | Mod: ZP | Performed by: INTERNAL MEDICINE

## 2018-11-26 PROCEDURE — 0296T ZIO PATCH HOLTER: CPT | Mod: ZF | Performed by: INTERNAL MEDICINE

## 2018-11-26 PROCEDURE — 80048 BASIC METABOLIC PNL TOTAL CA: CPT | Performed by: INTERNAL MEDICINE

## 2018-11-26 PROCEDURE — G0463 HOSPITAL OUTPT CLINIC VISIT: HCPCS

## 2018-11-26 PROCEDURE — 93005 ELECTROCARDIOGRAM TRACING: CPT | Mod: XU

## 2018-11-26 RX ORDER — LORATADINE 10 MG/1
10 TABLET ORAL DAILY PRN
COMMUNITY
Start: 2014-09-29

## 2018-11-26 ASSESSMENT — PAIN SCALES - GENERAL
PAINLEVEL: MODERATE PAIN (5)
PAINLEVEL: NO PAIN (0)

## 2018-11-26 NOTE — LETTER
11/26/2018       RE: Reny Christianson  [address] [facility] [address]     Dear Colleague,    Thank you for referring your patient, Reny Christianson, to the [facility] NEUROLOGY at [facility]. Please see a copy of my visit note below.    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  She has been doing well overall.  She reports no new issues or concerns.  She continues to live in a group home. She works 15-20 hours, she works at [employer]. She is sleeping and has excessive sleep, but not interfering with her daily activity and work. She denies any cardiac and respiratory symptoms.  She denies and gastrointestinal symptoms.             Allergies:    No Known Allergies          Medications:     Prescription Medications as of 11/26/2018             Ascorbic Acid (VITAMIN C PO) Take 500 mg by mouth 2 times daily    calcium-vitamin D (CALTRATE) 600-400 MG-UNIT per tablet Take 1 tablet by mouth 2 times daily    HYDROcodone-acetaminophen (NORCO) 5-325 MG per tablet Take 1 tablet by mouth every 6 hours as needed for moderate to severe pain    loratadine (CLARITIN) 10 MG tablet Take 10 mg by mouth daily as needed    multivitamin, therapeutic with minerals (MULTI-VITAMIN) TABS tablet Take 1 tablet by mouth daily Women's Health Vitamin                Review of Systems:   Answers for HPI/ROS submitted by the patient on 11/19/2018   General Symptoms: No  Skin Symptoms: No  HENT Symptoms: No  EYE SYMPTOMS: No  HEART SYMPTOMS: No  LUNG SYMPTOMS: No  INTESTINAL  SYMPTOMS: No  URINARY SYMPTOMS: No  GYNECOLOGIC SYMPTOMS: No  BREAST SYMPTOMS: No  SKELETAL SYMPTOMS: Yes  BLOOD SYMPTOMS: No  NERVOUS SYSTEM SYMPTOMS: No  MENTAL HEALTH SYMPTOMS: No  Muscle aches: Yes   Joint pain: Yes, just right knee. She had injured it         Physical Exam:     /70 (BP Location: Right arm, Patient Position: Sitting, Cuff Size: Adult Large)  Pulse 81  SpO2 97%   Physical Exam:   General: NAD  Head: Normocephalic, atraumatic  Eyes: No conjunctival injection, no scleral icterus.  Mouth: No oral lesions, no erythema or exudate in the oropharynx  Respiratory: No increased work of breathing  Cardiovascular: No lower extremity edema  Abdomen: Soft, non-tender, without organomegaly.  Extremities: Warm, dry  Neurologic:   Mental Status Exam: Alert, awake and easily engaged in interaction.   Cranial Nerves: PERRLA, EOMs intact, no nystagmus, facial movements symmetric but she has bilateral weakness in both eye and mouth closure, facial sensation intact to light touch, hearing intact to conversation, palate and uvula rise symmetrically, no deviation in uvula or tongue, tongue midline and fully mobile with no atrophy or fasciculations.    Motor: Normal tone in all four extremities, no atrophy or fasciculations.             Manual Motor Exam     Right Left A F  Right Left A F   Shoulder Abduction 5 5   Hip Flexion 5 5     Elbow Flexion 5 5   Knee Extension 5 5     Elbow Extension 5 5   Knee Flexion 5 5     Wrist Extension 5 5   Foot Dorsiflexion 5 5     Wrist flexion 5 5   Foot Plantar Flexion 5 5     Neck flexion 4 4    deep finger flexors 3 3                                                                                                                                                        Reflexes   Right Left  Right Left   Biceps 2 2 Patellar 2 2   Triceps 2 2 Achilles 2 2   Brachioradialis 2 2 Babinski Absent Absent       Coordination and Gait  Gait 1 Abnormal   Right Left   Romberg 0 Normal   Heel 1 Abnormal 1 Abnormal   Tandem 1 Abnormal  Toe 1 Abnormal 1 Abnormal               Assessment and Recommendations:   Cheryl Christianson is a 28 year old female 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  She has been running relatively stable course with no overt progression over the course of last few years.  Her main deficit consist of mild to moderate cognitive impairment and hypersomnolence.  Her myotonia is not interfering with her function.    Recommendations:  -Continue current treatment.  - Continue follow-up with cardiology as scheduled.  - Return to clinic in 1 year or sooner if necessary    I spent total of 15 minutes in face-to-face during today's visit. Over 50% of this time was spent counseling the patient and coordinating care. See note for details.    Kevin Dumas MD  [phone]       CC  Patient Care Team:  Mt Ibarra MD, Physician as PCP - Mick Dumont MD as MD (Cardiology)  Giulia Park GC as Genetic Counselor (Genetic Counselor, MS)  [facility]  SELF, REFERRED    Copy to patient  CHERYL CHRISTIANSON  [address] [facility] [address]  [facility] [address]

## 2018-11-26 NOTE — DISCHARGE INSTRUCTIONS
Ossur Foot Up device (available on Amazon ~$35)        Tri-Lock Brace (can be fit by Orthotist or purchased online/drugstore)        Ankle Foot Orthosis (carbon fiber style) (fit by an Orthotist with order from Dr. Dumas)

## 2018-11-26 NOTE — PROGRESS NOTES
HPI: 27 yo WF with hx of Myotonic Dystrophy Type I with no evidence of cardiac fibrosis presents with grandparents.  Denies new CP, SOB, COLLINS, bipedal edema, palpitaitons, exercise limitations, cough, presyncope or syncope.  Denies tobacco use.     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    SOCIAL HISTORY:  Social History     Social History     Marital status: Single     Spouse name: N/A     Number of children: N/A     Years of education: N/A     Social History Main Topics     Smoking status: Never Smoker     Smokeless tobacco: Never Used     Alcohol use No     Drug use: None     Sexual activity: Not Asked     Other Topics Concern     None     Social History Narrative       CURRENT MEDICATIONS:  Current Outpatient Prescriptions   Medication Sig Dispense Refill     Ascorbic Acid (VITAMIN C PO) Take 500 mg by mouth 2 times daily       calcium-vitamin D (CALTRATE) 600-400 MG-UNIT per tablet Take 1 tablet by mouth 2 times daily       loratadine (CLARITIN) 10 MG tablet Take 10 mg by mouth daily as needed       multivitamin, therapeutic with minerals (MULTI-VITAMIN) TABS tablet Take 1 tablet by mouth daily Women's Health Vitamin       HYDROcodone-acetaminophen (NORCO) 5-325 MG per tablet Take 1 tablet by mouth every 6 hours as needed for moderate to severe pain         ROS:   Constitutional: No fever, chills, or sweats. No weight gain/loss.   ENT: No visual disturbance, ear ache, epistaxis, sore throat.   Allergies/Immunologic: Negative.   Respiratory: No cough, hemoptysis.   Cardiovascular: As per HPI.   GI: No nausea, vomiting, hematemesis, melena, or hematochezia.   : No urinary frequency, dysuria, or hematuria.   Integument: Negative.   Psychiatric: Negative.   Neuro: Negative.   Endocrinology: Negative.   Musculoskeletal: Negative.    EXAM:  /83 (BP Location: Right arm, Patient Position: Chair,  "Cuff Size: Adult Large)  Pulse 72  Ht 1.575 m (5' 2\")  Wt 85.7 kg (189 lb)  SpO2 96%  BMI 34.57 kg/m2  General: appears comfortable, alert and articulate  Head: normocephalic, atraumatic  Eyes: anicteric sclera, EOMI  Neck: no adenopathy  Orophyarynx: moist mucosa, no lesions, dentition intact  Heart: regular, S1/S2, no murmur, gallop, rub, estimated JVP 7 cm  Lungs: clear, no rales or wheezing  Abdomen: soft, non-tender, bowel sounds present, no hepatosplenomegaly  Extremities: no clubbing, cyanosis or edema  Neurological: normal speech and affect, no gross motor deficits    Labs:  CBC RESULTS:  Lab Results   Component Value Date    WBC 4.6 07/24/2017    RBC 4.61 07/24/2017    HGB 14.1 07/24/2017    HCT 43.0 07/24/2017    MCV 93 07/24/2017    MCH 30.6 07/24/2017    MCHC 32.8 07/24/2017    RDW 13.7 07/24/2017     (L) 07/24/2017       CMP RESULTS:  Lab Results   Component Value Date     11/26/2018    POTASSIUM 4.0 11/26/2018    CHLORIDE 111 (H) 11/26/2018    CO2 21 11/26/2018    ANIONGAP 9 11/26/2018    GLC 87 11/26/2018    BUN 11 11/26/2018    CR 0.63 11/26/2018    GFRESTIMATED >90 11/26/2018    GFRESTBLACK >90 11/26/2018    SHONA 9.2 11/26/2018        INR RESULTS:  No results found for: INR    No results found for: MAG  No results found for: NTBNPI  No results found for: NTBNP    Assessment and Plan:   Pt with myotonic dystrophy without evidence of dystrophic cardiomyopathy.  Plan to obtain ziopatch today and have pt return in one year.      I have reviewed today's vital signs, notes, medications, labs and imaging.  I have also seen and examined the patient and agree with the findings and plan as outlined above.    CC  Patient Care Team:  Mt Ibarra as PCP - General  Mick Ball MD as MD (Cardiology)  Kevin Medel MD as MD (Pediatric Neurology)  Giulia Park GC as Genetic Counselor (Genetic Counselor, MS)  KEVIN MEDEL    "

## 2018-11-26 NOTE — NURSING NOTE
Chief Complaint   Patient presents with     RECHECK     [facility] RETURN - ANNUAL F/U       Claude Sarkar, EMT

## 2018-11-26 NOTE — PATIENT INSTRUCTIONS
Wear ZIO patch for 3 days and send back to company.  Follow up with Dr. Ball in one yr. Or as needed sooned    Gabriella Izaguirre, RN  Cardiology Care Coordinator  Please be aware that I work part-time but I will be checking messages several times per week.   For urgent needs, please call the number below.    [phone], press 1 for [employer], press 3 to speak to a nurse    .

## 2018-11-26 NOTE — MR AVS SNAPSHOT
After Visit Summary   11/26/2018    eRny Christianson    MRN: [id number]           Patient Information     Date Of Birth          1990        Visit Information        Provider Department      11/26/2018 2:30 PM Kevin Dumas MD [facility] Neurology        Today's Diagnoses     Myotonic muscular dystrophy (H)    -  1       Follow-ups after your visit        Additional Services     PT Referral ([place])       If you have not heard from the scheduling office within 2 business days, please call [phone] for all locations, with the exception of [place], please call [phone] and [place], please call [phone].    Please be aware that coverage of these services is subject to the terms and limitations of your health insurance plan.  Call member services at your health plan with any benefit or coverage questions.                  Follow-up notes from your care team     Return in about 1 year (around 11/26/2019).      Who to contact     Please call your clinic at [phone] to:    Ask questions about your health    Make or cancel appointments    Discuss your medicines    Learn about your test results    Speak to your doctor            Additional Information About Your Visit        [employer] Information     [employer] gives you secure access to your electronic health record. If you see a primary care provider, you can also send messages to your care team and make appointments. If you have questions, please call your primary care clinic.  If you do not have a primary care provider, please call [phone] and they will assist you.      Kanga is an electronic gateway that provides easy, online access to your medical records. With [employer], you can request a clinic appointment, read your test results, renew a prescription or communicate with your care team.     To access your existing account, please contact your [facility] or call [phone] for  assistance.        Care EveryWhere ID     This is your Care EveryWhere ID. This could be used by other organizations to access your [facility] medical records  [id number]        Your Vitals Were     Pulse Pulse Oximetry                81 97%           Blood Pressure from Last 3 Encounters:   11/26/18 125/83   11/26/18 118/70   07/24/17 118/81    Weight from Last 3 Encounters:   11/26/18 85.7 kg (189 lb)   07/24/17 72.1 kg (159 lb)   07/24/17 72.4 kg (159 lb 11.2 oz)               Primary Care Provider    [place] [id number] [facility] [address]        Equal Access to Services     [facility] : Hadii aad ku hadasho Soben, waaxda luqadaha, qaybta kaalmada adeegyada, rohan ortiz. So [facility] [phone].    ATENCIÓN: Si habla español, tiene a saunders disposición servicios gratuitos de asistencia lingüística. Llame al [phone].    We comply with applicable federal civil rights laws and [place] laws. We do not discriminate on the basis of race, color, national origin, age, disability, sex, sexual orientation, or gender identity.            Thank you!     Thank you for choosing [facility] NEUROLOGY  for your care. Our goal is always to provide you with excellent care. Hearing back from our patients is one way we can continue to improve our services. Please take a few minutes to complete the written survey that you may receive in the mail after your visit with us. Thank you!             Your Updated Medication List - Protect others around you: Learn how to safely use, store and throw away your medicines at www.disposemymeds.org.          This list is accurate as of 11/26/18 11:59 PM.  Always use your most recent med list.                   Brand Name Dispense Instructions for use Diagnosis    calcium carbonate 600 mg-vitamin D 400 units 600-400 MG-UNIT per tablet    CALTRATE     Take 1 tablet by mouth 2 times daily        HYDROcodone-acetaminophen  5-325 MG tablet    NORCO     Take 1 tablet by mouth every 6 hours as needed for moderate to severe pain        loratadine 10 MG tablet    CLARITIN     Take 10 mg by mouth daily as needed        Multi-vitamin tablet      Take 1 tablet by mouth daily Women's Health Vitamin        VITAMIN C PO      Take 500 mg by mouth 2 times daily

## 2018-11-26 NOTE — LETTER
11/26/2018      RE: Reny Christianson  [address] [facility] [address]       Dear Colleague,    Thank you for the opportunity to participate in the care of your patient, Reny Christianson, at the [facility] HEART [facility]. Please see a copy of my visit note below.    HPI: 29 yo WF with hx of Myotonic Dystrophy Type I with no evidence of cardiac fibrosis presents with grandparents.  Denies new CP, SOB, COLLINS, bipedal edema, palpitaitons, exercise limitations, cough, presyncope or syncope.  Denies tobacco use.     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    SOCIAL HISTORY:  Social History     Social History     Marital status: Single     Spouse name: N/A     Number of children: N/A     Years of education: N/A     Social History Main Topics     Smoking status: Never Smoker     Smokeless tobacco: Never Used     Alcohol use No     Drug use: None     Sexual activity: Not Asked     Other Topics Concern     None     Social History Narrative       CURRENT MEDICATIONS:  Current Outpatient Prescriptions   Medication Sig Dispense Refill     Ascorbic Acid (VITAMIN C PO) Take 500 mg by mouth 2 times daily       calcium-vitamin D (CALTRATE) 600-400 MG-UNIT per tablet Take 1 tablet by mouth 2 times daily       loratadine (CLARITIN) 10 MG tablet Take 10 mg by mouth daily as needed       multivitamin, therapeutic with minerals (MULTI-VITAMIN) TABS tablet Take 1 tablet by mouth daily Women's Health Vitamin       HYDROcodone-acetaminophen (NORCO) 5-325 MG per tablet Take 1 tablet by mouth every 6 hours as needed for moderate to severe pain         ROS:   Constitutional: No fever, chills, or sweats. No weight gain/loss.   ENT: No visual disturbance, ear ache, epistaxis, sore throat.   Allergies/Immunologic: Negative.   Respiratory: No cough, hemoptysis.  "  Cardiovascular: As per HPI.   GI: No nausea, vomiting, hematemesis, melena, or hematochezia.   : No urinary frequency, dysuria, or hematuria.   Integument: Negative.   Psychiatric: Negative.   Neuro: Negative.   Endocrinology: Negative.   Musculoskeletal: Negative.    EXAM:  /83 (BP Location: Right arm, Patient Position: Chair, Cuff Size: Adult Large)  Pulse 72  Ht 1.575 m (5' 2\")  Wt 85.7 kg (189 lb)  SpO2 96%  BMI 34.57 kg/m2  General: appears comfortable, alert and articulate  Head: normocephalic, atraumatic  Eyes: anicteric sclera, EOMI  Neck: no adenopathy  Orophyarynx: moist mucosa, no lesions, dentition intact  Heart: regular, S1/S2, no murmur, gallop, rub, estimated JVP 7 cm  Lungs: clear, no rales or wheezing  Abdomen: soft, non-tender, bowel sounds present, no hepatosplenomegaly  Extremities: no clubbing, cyanosis or edema  Neurological: normal speech and affect, no gross motor deficits    Labs:  CBC RESULTS:  Lab Results   Component Value Date    WBC 4.6 07/24/2017    RBC 4.61 07/24/2017    HGB 14.1 07/24/2017    HCT 43.0 07/24/2017    MCV 93 07/24/2017    MCH 30.6 07/24/2017    MCHC 32.8 07/24/2017    RDW 13.7 07/24/2017     (L) 07/24/2017       CMP RESULTS:  Lab Results   Component Value Date     11/26/2018    POTASSIUM 4.0 11/26/2018    CHLORIDE 111 (H) 11/26/2018    CO2 21 11/26/2018    ANIONGAP 9 11/26/2018    GLC 87 11/26/2018    BUN 11 11/26/2018    CR 0.63 11/26/2018    GFRESTIMATED >90 11/26/2018    GFRESTBLACK >90 11/26/2018    SHONA 9.2 11/26/2018        INR RESULTS:  No results found for: INR    No results found for: MAG  No results found for: NTBNPI  No results found for: NTBNP    Assessment and Plan:   Pt with myotonic dystrophy without evidence of dystrophic cardiomyopathy.  Plan to obtain ziopatch today and have pt return in one year.      I have reviewed today's vital signs, notes, medications, labs and imaging.  I have also seen and examined the patient and " agree with the findings and plan as outlined above.    Mick Ball MD      CC  Patient Care Team:  Mt Ibarra as PCP - General  Mick Ball MD as MD (Cardiology)  Kevin Medel MD as MD (Pediatric Neurology)  Giulia Park GC as Genetic Counselor (Genetic Counselor, MS)  KEVIN MEDEL

## 2018-11-26 NOTE — MR AVS SNAPSHOT
After Visit Summary   11/26/2018    Reny Christianson    MRN: [id number]           Patient Information     Date Of Birth          1990        Visit Information        Provider Department      11/26/2018 3:30 PM Mick Ball MD [facility]        Today's Diagnoses     Screening for cardiovascular condition        Myotonic dystrophy, type 1 (H)          Care Instructions    Wear ZIO patch for 3 days and send back to company.  Follow up with Dr. Ball in one yr. Or as needed sooned    Gabriella Izaguirre, RN  Cardiology Care Coordinator  Please be aware that I work part-time but I will be checking messages several times per week.   For urgent needs, please call the number below.    [phone], press 1 for [employer], press 3 to speak to a nurse    .            Follow-ups after your visit        Additional Services     Follow-Up with Cardiologist                 Future tests that were ordered for you today     Open Future Orders        Priority Expected Expires Ordered    Follow-Up with Cardiologist Routine 9/9/2019 10/7/2019 11/26/2018    PT Referral ([place]) Routine  11/26/2019 11/26/2018            Who to contact     If you have questions or need follow up information about today's clinic visit or your schedule please contact [facility] directly at [phone].  Normal or non-critical lab and imaging results will be communicated to you by MyChart, letter or phone within 4 business days after the clinic has received the results. If you do not hear from us within 7 days, please contact the clinic through MyChart or phone. If you have a critical or abnormal lab result, we will notify you by phone as soon as possible.  Submit refill requests through [employer] or call your pharmacy and they will forward the refill request to us. Please allow 3 business days for your refill to be completed.          Additional Information About Your Visit        MyChart Information     [employer]  "gives you secure access to your electronic health record. If you see a primary care provider, you can also send messages to your care team and make appointments. If you have questions, please call your primary care clinic.  If you do not have a primary care provider, please call [phone] and they will assist you.        Care EveryWhere ID     This is your Care EveryWhere ID. This could be used by other organizations to access your [facility] medical records  [id number]        Your Vitals Were     Pulse Height Pulse Oximetry BMI (Body Mass Index)          72 1.575 m (5' 2\") 96% 34.57 kg/m2         Blood Pressure from Last 3 Encounters:   11/26/18 125/83   11/26/18 118/70   07/24/17 118/81    Weight from Last 3 Encounters:   11/26/18 85.7 kg (189 lb)   07/24/17 72.1 kg (159 lb)   07/24/17 72.4 kg (159 lb 11.2 oz)              We Performed the Following     EKG 12-lead, tracing only (Same Day)     Ziopatch Holter Monitor - Adult        Primary Care Provider    [place] [id number] [facility] [address]        Equal Access to Services     [facility] AH: Hadii aad ku hadasho Soomaali, waaxda luqadaha, qaybta kaalmada adeegyada, waxay nickin hayradhan herson mcclain lakennan ah. So [facility] [phone].    ATENCIÓN: Si habla español, tiene a saunders disposición servicios gratuitos de asistencia lingüística. Llame al [phone].    We comply with applicable federal civil rights laws and [place] laws. We do not discriminate on the basis of race, color, national origin, age, disability, sex, sexual orientation, or gender identity.            Thank you!     Thank you for choosing [facility]  for your care. Our goal is always to provide you with excellent care. Hearing back from our patients is one way we can continue to improve our services. Please take a few minutes to complete the written survey that you may receive in the mail after your visit with us. Thank you!           "   Your Updated Medication List - Protect others around you: Learn how to safely use, store and throw away your medicines at www.disposemymeds.org.          This list is accurate as of 11/26/18  4:51 PM.  Always use your most recent med list.                   Brand Name Dispense Instructions for use Diagnosis    calcium carbonate 600 mg-vitamin D 400 units 600-400 MG-UNIT per tablet    CALTRATE     Take 1 tablet by mouth 2 times daily        HYDROcodone-acetaminophen 5-325 MG tablet    NORCO     Take 1 tablet by mouth every 6 hours as needed for moderate to severe pain        loratadine 10 MG tablet    CLARITIN     Take 10 mg by mouth daily as needed        Multi-vitamin Tabs tablet      Take 1 tablet by mouth daily Women's Health Vitamin        VITAMIN C PO      Take 500 mg by mouth 2 times daily

## 2018-11-26 NOTE — PROGRESS NOTES
"    OUTPATIENT PHYSICAL THERAPY CLINIC NOTE  Reny Christianson  YOB: 1990  [id number]    Type of visit:         Evaluation     Date of service: 11/26/2018    Referring provider: Dr. Dumas    Others present at visit:  Parent(s)- mom and dad    Medical diagnosis:   Muscular dystrophy (MD)- Myotonic type 1    Date of diagnosis: childhood    Pertinent history of current problem (include personal factors and/or comorbidities that impact the plan of care): Pt with distal UE/LE weakness consistent with Myotonic MD type 1. Pt has mild motor dysfunction with moderate cognitive disorder. Pt with h/o colon cancer at 24 y.o., s/p chemo, partial bowel resection, with pseudoobstruction Oct 2016.    Cardio-respiratory status:  Forced vital capacity: 80 % of predicted     Height/Weight: 5'2\" / 189 lb    Living environment:  Group home    Living environment barriers:  Level entry, no stairs     Current assistance/living environment:  24/7 assistance available at group home, has 1 other resident      Current mobility equipment:  Neoprene knee brace (wearing today)     Current ADL equipment:  None  Tub/shower combo, going fine to step in  Electric can opener    Technology used: NT    Patient concerns/goals: Pt with notable foot slap per mom, not tripping. Has had 2 falls in past year- due to ice primarily. Pt is on her feet at work, works in [employer] 3-6 hour shifts.     Evaluation   Interview completed.   Pain assessment: Wearing neoprene knee brace today d/t knee bothering her, brace helps.   Range of motion: B UEs/LEs WFL    Manual muscle testing: Shoulder flexion/abd 5/5 B, bicep/tricep 5/5 B, wrist ext/flex 5/5 B, finger ext 4/5 B, finger flex 4+/5 B, hip flex/abd/add 5/5 B, knee ext/flex 5/5 B, ankle DF 4/5 B, PF 5/5 manually B   Gait: Pt amb without assistive device with shortened step length B, mild slightly audible foot slap, pt wearing slip on UGG style boots today.   Cognition: Intact    Fall Risk " Screen:   Has the patient fallen 2 or more times in the last year? Yes, 2, reports due to ice       Has the patient fallen and had an injury in the past year? No. Significant scrape to knee, no fx       Timed Up and Go Score: 9.57 sec     25ft walk: 9.47 sec, 22 steps    Is the patient a fall risk? Yes, department fall risk interventions implemented     Impairments:  Fatigue  Coordination  Balance     Treatment diagnosis:  Impaired mobility  Impaired activities of daily living    Clinical Presentation: Evolving/Changing  Clinical Presentation Rationale: Gradually progressive nature of Myotonic dystrophy, with mild foot drop leading to increased falls risk  Clinical Decision Making (Complexity): Low complexity     Recommendations/Plan of care:  1 session evaluation & treatment.  Equipment recommended: Consider ankle brace options (see below).     Goals:   Target date: 11/26/18  Patient, family and/or caregiver will verbalize understanding of evaluation results and implications for functional performance.  Patient, family and/or caregiver will verbalize/demonstrate understanding of compensatory methods /equipment to enhance functional independence and safety.    Educational assessment/barriers to learning:   No barriers noted     Treatment provided this date:   Gait training, 18 minutes  -Feedback provided regarding mild foot drop noted with gait and muscle testing. Pt is safe with short distance gait over level surface, but is at greater risk of tripping/falls on uneven ground, steps, or with fatigue. We discussed role of bracing to reduce risk of tripping, provide support to ankles for safety and energy savings. Discussed options including carbon fiber AFO, which pt trialed today with good comfort and support noted. We also discussed non-AFO options- Ossur Foot-up and Tri-lock brace. Pt may be open to non-AFO options at this time. We discussed how to obtain and provided pictures for pt/family to consider their  options. Foot drop is mild at this time, pt will likely do well with non-AFO options to begin with, she voices understanding of options presented.    Response to treatment/recommendations: Verbalizes understanding    Goal attainment:  All goals met     Risks and benefits of evaluation/treatment have been explained.  Patient, family and/or caregiver are in agreement with Plan of Care.     Timed Code Treatment Minutes: 18  Total Treatment Time (sum of timed and untimed services): 24    Signature: Precious Tejada, PT   Date: 11/26/2018    Medicare G-code:  Mobility: Walking and Moving Around  Current Status , Goal , Discharge   1 session only, modifier the same for all G-codes.  CJ: 20-39% impairment  Modifier determined by clinical judgment in conjunction with objective data and subjective report.      Certification: Medicare/Medicaid  Onset date: 11/26/2018  Start of care date: 11/26/2018  Certification date from 11/26/2018 to 11/26/2018    I CERTIFY THE NEED FOR THESE SERVICES FURNISHED UNDER        THIS PLAN OF TREATMENT AND WHILE UNDER MY CARE     (Physician attestation of this document indicates review and certification of the therapy plan).

## 2018-11-26 NOTE — NURSING NOTE
Chief Complaint   Patient presents with     Follow Up For     Myotonic dystrophy 1     Medications reviewed and vitals/ EKG performed.  Yesenia Bruno CMA

## 2018-11-26 NOTE — MR AVS SNAPSHOT
After Visit Summary   11/26/2018    Reny Christianson    MRN: [id number]           Patient Information     Date Of Birth          1990        Visit Information        Provider Department      11/26/2018 3:30 PM Precious Tejada, [facility]        Further instructions from your care team       Ossur Foot Up device (available on Amazon ~$35)        Tri-Lock Brace (can be fit by Orthotist or purchased online/drugstore)        Ankle Foot Orthosis (carbon fiber style) (fit by an Orthotist with order from Dr. Dumas)             Follow-ups after your visit        Future tests that were ordered for you today     Open Future Orders        Priority Expected Expires Ordered    PT Referral ([facility]) Routine  11/26/2019 11/26/2018            Who to contact     Please call your clinic at [phone] to:    Ask questions about your health    Make or cancel appointments    Discuss your medicines    Learn about your test results    Speak to your doctor            Additional Information About Your Visit        [employer] Information     [employer] gives you secure access to your electronic health record. If you see a primary care provider, you can also send messages to your care team and make appointments. If you have questions, please call your primary care clinic.  If you do not have a primary care provider, please call [phone] and they will assist you.      [employer] is an electronic gateway that provides easy, online access to your medical records. With [employer], you can request a clinic appointment, read your test results, renew a prescription or communicate with your care team.     To access your existing account, please contact your [facility] or call [phone] for assistance.        Care EveryWhere ID     This is your Care EveryWhere ID. This could be used by other organizations to access your [facility] medical records  [id number]          Blood Pressure from Last 3 Encounters:   11/26/18 118/70   07/24/17 118/81   07/24/17 118/81    Weight from Last 3 Encounters:   07/24/17 72.1 kg (159 lb)   07/24/17 72.4 kg (159 lb 11.2 oz)              Today, you had the following     No orders found for display       Primary Care Provider    [place] [facility] [address]  [place] [facility] [address]        Equal Access to Services     [facility] : Hadii aad ku hadasho Soomaali, waaxda luqadaha, qaybta kaalmada adeegyada, waxay idiin hayaan adeeg sarahi marks . So [facility] [phone].    ATENCIÓN: Si habla español, tiene a saudners disposición servicios gratuitos de asistencia lingüística. Llame al [phone].    We comply with applicable federal civil rights laws and [place] laws. We do not discriminate on the basis of race, color, national origin, age, disability, sex, sexual orientation, or gender identity.            Thank you!     Thank you for choosing [facility]  for your care. Our goal is always to provide you with excellent care. Hearing back from our patients is one way we can continue to improve our services. Please take a few minutes to complete the written survey that you may receive in the mail after your visit with us. Thank you!             Your Updated Medication List - Protect others around you: Learn how to safely use, store and throw away your medicines at www.disposemymeds.org.          This list is accurate as of 11/26/18  3:37 PM.  Always use your most recent med list.                   Brand Name Dispense Instructions for use Diagnosis    calcium carbonate 600 mg-vitamin D 400 units 600-400 MG-UNIT per tablet    CALTRATE     Take 1 tablet by mouth 2 times daily        HYDROcodone-acetaminophen 5-325 MG tablet    NORCO     Take 1 tablet by mouth every 6 hours as needed for moderate to severe pain        loratadine 10 MG tablet    CLARITIN     Take 10 mg by mouth daily as needed         Multi-vitamin Tabs tablet      Take 1 tablet by mouth daily Women's Health Vitamin        VITAMIN C PO      Take 500 mg by mouth 2 times daily

## 2018-11-27 LAB — INTERPRETATION ECG - MUSE: NORMAL

## 2018-11-28 LAB
EXPTIME-PRE: 1.52 SEC
FEF2575-%PRED-PRE: 114 %
FEF2575-PRE: 3.97 L/SEC
FEF2575-PRED: 3.48 L/SEC
FEFMAX-%PRED-PRE: 74 %
FEFMAX-PRE: 5.14 L/SEC
FEFMAX-PRED: 6.85 L/SEC
FEV1-%PRED-PRE: 89 %
FEV1-PRE: 2.78 L
FEV1FEV6-PRE: 94 %
FEV1FEV6-PRED: 85 %
FEV1FVC-PRE: 94 %
FEV1FVC-PRED: 84 %
FIFMAX-PRE: 4.36 L/SEC
FVC-%PRED-PRE: 80 %
FVC-PRE: 2.96 L
FVC-PRED: 3.66 L
MEP-PRE: 45 CMH2O
MIP-PRE: -50 CMH2O

## 2018-12-06 NOTE — PROGRESS NOTES
[facility]                  Muscular Dystrophy Clinic Note     Reny Christianson MRN# [id number]   YOB: 1990 Age: 28 year old      Date of Visit: Nov 26, 2018    Primary care provider: No Ref-Primary, Physician         Interval Change:      Reny Christianson is a 28 year old female was seen and examined at the pediatric neurology clinic on Nov 26, 2018 for evaluation of myotonic dystrophy.  She has been doing well overall.  She reports no new issues or concerns.  She continues to live in a group home. She works 15-20 hours, she works at [employer]. She is sleeping and has excessive sleep, but not interfering with her daily activity and work. She denies any cardiac and respiratory symptoms.  She denies and gastrointestinal symptoms.             Allergies:    No Known Allergies          Medications:     Prescription Medications as of 11/26/2018             Ascorbic Acid (VITAMIN C PO) Take 500 mg by mouth 2 times daily    calcium-vitamin D (CALTRATE) 600-400 MG-UNIT per tablet Take 1 tablet by mouth 2 times daily    HYDROcodone-acetaminophen (NORCO) 5-325 MG per tablet Take 1 tablet by mouth every 6 hours as needed for moderate to severe pain    loratadine (CLARITIN) 10 MG tablet Take 10 mg by mouth daily as needed    multivitamin, therapeutic with minerals (MULTI-VITAMIN) TABS tablet Take 1 tablet by mouth daily Women's Health Vitamin                Review of Systems:   Answers for HPI/ROS submitted by the patient on 11/19/2018   General Symptoms: No  Skin Symptoms: No  HENT Symptoms: No  EYE SYMPTOMS: No  HEART SYMPTOMS: No  LUNG SYMPTOMS: No  INTESTINAL SYMPTOMS: No  URINARY SYMPTOMS: No  GYNECOLOGIC SYMPTOMS: No  BREAST SYMPTOMS: No  SKELETAL SYMPTOMS: Yes  BLOOD SYMPTOMS: No  NERVOUS SYSTEM SYMPTOMS: No  MENTAL HEALTH SYMPTOMS: No  Muscle aches: Yes   Joint pain: Yes, just right knee. She had injured it         Physical Exam:     /70 (BP Location: Right  arm, Patient Position: Sitting, Cuff Size: Adult Large)  Pulse 81  SpO2 97%   Physical Exam:   General: NAD  Head: Normocephalic, atraumatic  Eyes: No conjunctival injection, no scleral icterus.  Mouth: No oral lesions, no erythema or exudate in the oropharynx  Respiratory: No increased work of breathing  Cardiovascular: No lower extremity edema  Abdomen: Soft, non-tender, without organomegaly.  Extremities: Warm, dry  Neurologic:   Mental Status Exam: Alert, awake and easily engaged in interaction.   Cranial Nerves: PERRLA, EOMs intact, no nystagmus, facial movements symmetric but she has bilateral weakness in both eye and mouth closure, facial sensation intact to light touch, hearing intact to conversation, palate and uvula rise symmetrically, no deviation in uvula or tongue, tongue midline and fully mobile with no atrophy or fasciculations.    Motor: Normal tone in all four extremities, no atrophy or fasciculations.             Manual Motor Exam     Right Left A F  Right Left A F   Shoulder Abduction 5 5   Hip Flexion 5 5     Elbow Flexion 5 5   Knee Extension 5 5     Elbow Extension 5 5   Knee Flexion 5 5     Wrist Extension 5 5   Foot Dorsiflexion 5 5     Wrist flexion 5 5   Foot Plantar Flexion 5 5     Neck flexion 4 4    deep finger flexors 3 3                                                                                                                                                        Reflexes   Right Left  Right Left   Biceps 2 2 Patellar 2 2   Triceps 2 2 Achilles 2 2   Brachioradialis 2 2 Babinski Absent Absent       Coordination and Gait  Gait 1 Abnormal   Right Left   Romberg 0 Normal  Heel 1 Abnormal 1 Abnormal   Tandem 1 Abnormal  Toe 1 Abnormal 1 Abnormal               Assessment and Recommendations:   Reny Christianson is a 28 year old female 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  She has been running relatively stable course with no overt progression over the course of  last few years.  Her main deficit consist of mild to moderate cognitive impairment and hypersomnolence.  Her myotonia is not interfering with her function.    Recommendations:  -Continue current treatment.  - Continue follow-up with cardiology as scheduled.  - Return to clinic in 1 year or sooner if necessary    I spent total of 15 minutes in face-to-face during today's visit. Over 50% of this time was spent counseling the patient and coordinating care. See note for details.    Kevin Dumas MD  [phone]         Patient Care Team:  Mt Ibarra MD, Physician as PCP - Mick Dumont MD as MD (Cardiology)  Giulia Park GC as Genetic Counselor (Genetic Counselor, MS)  [facility]  SELF, REFERRED    Copy to patient  CHERYL SPARKS  [address]  [facility] [address]

## 2019-07-22 ENCOUNTER — ANCILLARY PROCEDURE (OUTPATIENT)
Dept: CARDIOLOGY | Facility: CLINIC | Age: 29
End: 2019-07-22
Attending: INTERNAL MEDICINE
Payer: MEDICARE

## 2019-07-22 ENCOUNTER — OFFICE VISIT (OUTPATIENT)
Dept: NEUROLOGY | Facility: CLINIC | Age: 29
End: 2019-07-22
Payer: MEDICARE

## 2019-07-22 VITALS
HEART RATE: 63 BPM | OXYGEN SATURATION: 100 % | SYSTOLIC BLOOD PRESSURE: 119 MMHG | BODY MASS INDEX: 34.98 KG/M2 | WEIGHT: 197.4 LBS | DIASTOLIC BLOOD PRESSURE: 80 MMHG | HEIGHT: 63 IN | TEMPERATURE: 98.2 F | RESPIRATION RATE: 16 BRPM

## 2019-07-22 VITALS
OXYGEN SATURATION: 97 % | SYSTOLIC BLOOD PRESSURE: 108 MMHG | DIASTOLIC BLOOD PRESSURE: 74 MMHG | HEIGHT: 62 IN | HEART RATE: 64 BPM | BODY MASS INDEX: 36.84 KG/M2 | WEIGHT: 200.2 LBS

## 2019-07-22 DIAGNOSIS — G71.11 MYOTONIC DYSTROPHY, TYPE 1 (H): ICD-10-CM

## 2019-07-22 DIAGNOSIS — Z13.6 SCREENING FOR CARDIOVASCULAR CONDITION: ICD-10-CM

## 2019-07-22 DIAGNOSIS — Z85.038 HISTORY OF COLON CANCER: ICD-10-CM

## 2019-07-22 DIAGNOSIS — G71.11 MYOTONIC DYSTROPHY, TYPE 1 (H): Primary | ICD-10-CM

## 2019-07-22 DIAGNOSIS — R00.2 PALPITATIONS: ICD-10-CM

## 2019-07-22 LAB
ANION GAP SERPL CALCULATED.3IONS-SCNC: 4 MMOL/L (ref 3–14)
BUN SERPL-MCNC: 10 MG/DL (ref 7–30)
CALCIUM SERPL-MCNC: 9.1 MG/DL (ref 8.5–10.1)
CHLORIDE SERPL-SCNC: 112 MMOL/L (ref 94–109)
CO2 SERPL-SCNC: 24 MMOL/L (ref 20–32)
CREAT SERPL-MCNC: 0.66 MG/DL (ref 0.52–1.04)
GFR SERPL CREATININE-BSD FRML MDRD: >90 ML/MIN/{1.73_M2}
GLUCOSE SERPL-MCNC: 101 MG/DL (ref 70–99)
POTASSIUM SERPL-SCNC: 3.8 MMOL/L (ref 3.4–5.3)
SODIUM SERPL-SCNC: 140 MMOL/L (ref 133–144)

## 2019-07-22 PROCEDURE — 93010 ELECTROCARDIOGRAM REPORT: CPT | Mod: ZP | Performed by: INTERNAL MEDICINE

## 2019-07-22 PROCEDURE — 80048 BASIC METABOLIC PNL TOTAL CA: CPT | Performed by: INTERNAL MEDICINE

## 2019-07-22 PROCEDURE — 36415 COLL VENOUS BLD VENIPUNCTURE: CPT | Performed by: INTERNAL MEDICINE

## 2019-07-22 PROCEDURE — 99213 OFFICE O/P EST LOW 20 MIN: CPT | Mod: ZP | Performed by: INTERNAL MEDICINE

## 2019-07-22 PROCEDURE — G0463 HOSPITAL OUTPT CLINIC VISIT: HCPCS | Mod: 25,ZF

## 2019-07-22 PROCEDURE — 0296T ZIO PATCH HOLTER ADULT PEDIATRIC GREATER THAN 48 HRS: CPT | Mod: ZF

## 2019-07-22 PROCEDURE — 93005 ELECTROCARDIOGRAM TRACING: CPT | Mod: XU

## 2019-07-22 ASSESSMENT — MIFFLIN-ST. JEOR
SCORE: 1576.35
SCORE: 1579.53

## 2019-07-22 ASSESSMENT — PAIN SCALES - GENERAL
PAINLEVEL: NO PAIN (0)
PAINLEVEL: NO PAIN (0)

## 2019-07-22 NOTE — LETTER
7/22/2019       RE: Reny Christianson  [address] [facility] [address]     Dear Colleague,    Thank you for referring your patient, Reny Christianson, to the [facility] NEUROLOGY at [facility]. Please see a copy of my visit note below.                 [facility]                  Muscular Dystrophy Clinic Note     Reny Christianson MRN# [id number]   YOB: 1990 Age: 29 year old      Date of Visit: Jul 22, 2019    Primary care provider: No primary care provider on file.        History is obtained from the patient, family and medical record       Interval Change:      Reny Christianson is a 29 year old female was seen and examined at the muscular dystrophy clinic on Jul 22, 2019 for evaluation of myotonic dystrophy type I.  She reports no significant change in her course.  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  She does have some hypersomnolence.  She sleeps for about 10 hours.  She takes naps when she is after work.  She denies HA.  She denies vision problems. She has no stomach problems.               Allergies:      Allergies   Allergen Reactions     Seasonal Allergies Itching and Unknown     Sneezing and itchy eyes             Medications:     Prescription Medications as of 7/22/2019       Rx Number Disp Refills Start End Last Dispensed Date Next Fill Date Owning Pharmacy    Ascorbic Acid (VITAMIN C PO)            Sig: Take 500 mg by mouth 2 times daily    Class: Historical    Route: Oral    calcium-vitamin D (CALTRATE) 600-400 MG-UNIT per tablet            Sig: Take 1 tablet by mouth 2 times daily    Class: Historical    Route: Oral    loratadine (CLARITIN) 10 MG tablet    9/29/2014        Sig: Take 10 mg by mouth daily as needed    Class: Historical    Route: Oral    multivitamin, therapeutic with minerals (MULTI-VITAMIN) TABS tablet            Sig: Take 1 tablet by  "mouth daily Women's Health Vitamin    Class: Historical    Route: Oral                Review of Systems:   The 10 point Review of Systems is negative other than noted in the HPI         Physical Exam:     /80 (BP Location: Right arm, Patient Position: Chair, Cuff Size: Adult Regular)   Pulse 63   Temp 98.2  F (36.8  C) (Oral)   Resp 16   Ht 1.6 m (5' 3\")   Wt 89.5 kg (197 lb 6.4 oz)   SpO2 100%   BMI 34.97 kg/m      Physical Exam:   General: NAD  Head: Normocephalic, atraumatic  Eyes: No conjunctival injection, no scleral icterus.  Mouth: No oral lesions, no erythema or exudate in the oropharynx  Respiratory: No increased work of breathing  Cardiovascular: No lower extremity edema  Abdomen: Soft, non-tender, without organomegaly.  Extremities: Warm, dry  Neurologic:   Mental Status Exam: Alert, awake and easily engaged in interaction.   Cranial Nerves: PERRLA, EOMs intact, no nystagmus, facial movements symmetric,                 facial sensation intact to light touch, hearing intact to conversation, palate and uvula               rise symmetrically, no deviation in uvula or tongue, tongue midline and fully mobile                with no atrophy or fasciculations.    Motor:            Manual Motor Exam     Right Left A F  Right Left A F   Shoulder Abduction 4.5 4.5   Hip Flexion 4.5 4.5     Elbow Flexion 5 5   Knee Extension 5 5     Elbow Extension 5 5   Knee Flexion 5 5     Wrist Extension 4 4   Foot Dorsiflexion 5 5     Wrist flexion 5 5   Foot Plantar Flexion 5 5                                                                                                                                                        Reflexes   Right Left  Right Left   Biceps Absent Absent Patellar Absent Absent   Triceps Absent Absent Achilles Absent Absent   Brachioradialis Absent Absent Babinski Absent Absent       Coordination and Gait  Gait 0 Normal   Right Left   Romberg 3 Not tested  Heel 1 Abnormal 1 Abnormal "   Tandem 2 Not tested  Toe 0 Normal 0 Normal               Data:     Last Basic Metabolic Panel:  Lab Results   Component Value Date     07/22/2019      Lab Results   Component Value Date    POTASSIUM 3.8 07/22/2019     Lab Results   Component Value Date    CHLORIDE 112 07/22/2019     Lab Results   Component Value Date    SHONA 9.1 07/22/2019     Lab Results   Component Value Date    CO2 24 07/22/2019     Lab Results   Component Value Date    BUN 10 07/22/2019     Lab Results   Component Value Date    CR 0.66 07/22/2019     Lab Results   Component Value Date     07/22/2019          Assessment and Recommendations:   Cheryl Christianson is a 28 year old female 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  She has been running relatively stable course with no overt progression over the course of last few years.  Her main deficit consist of mild to moderate cognitive impairment and hypersomnolence.  Her myotonia is not interfering with her function.     Recommendations:  -Continue current treatment.  - Continue follow-up with cardiology as scheduled.  - Return to clinic in 1 year or sooner if necessary     I spent total of 15 minutes in face-to-face during today's visit. Over 50% of this time was spent counseling the patient and coordinating care. See note for details.     Kevin Dumas MD  [phone]       CC  Patient Care Team:  Mick Ball MD as MD (Cardiology)  Kevin Dumas MD as MD (Pediatric Neurology)  SELF, REFERRED    Copy to patient  CHERYL CHRISTIANSON  [address] [facility] [address]

## 2019-07-22 NOTE — PATIENT INSTRUCTIONS
Wear ZIO patch for 3 days and send back to company  Will repeat cardiac MRI in one yr.  Follow up with Dr. Ball in one yr    Gabriella Izaguirre, RN  Cardiology Care Coordinator  Please be aware that I work part-time but I will be checking messages several times per week.   For urgent needs, please call the number below.    [phone], press 1 for [employer], press 3 to speak to a nurse    .  ,

## 2019-07-22 NOTE — LETTER
7/22/2019      RE: Reny Christianson  [address] [facility] [address]       Dear Colleague,    Thank you for the opportunity to participate in the care of your patient, Reny Christianson, at the [facility] HEART CARE at [facility]. Please see a copy of my visit note below.    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  Denies new CP, SOB, COLLINS, orthopnea, PND or bipedal edema, presyncope, syncope, new fatigue and change in exertional capacity.  Works at [employer] for the past 3 yr period.  Denies tobacco abuse hx.      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    SOCIAL HISTORY:  Social History     Socioeconomic History     Marital status: Single     Spouse name: None     Number of children: None     Years of education: None     Highest education level: None   Occupational History     None   Social Needs     Financial resource strain: None     Food insecurity:     Worry: None     Inability: None     Transportation needs:     Medical: None     Non-medical: None   Tobacco Use     Smoking status: Never Smoker     Smokeless tobacco: Never Used   Substance and Sexual Activity     Alcohol use: No     Drug use: None     Sexual activity: None   Lifestyle     Physical activity:     Days per week: None     Minutes per session: None     Stress: None   Relationships     Social connections:     Talks on phone: None     Gets together: None     Attends Evangelical service: None     Active member of club or organization: None     Attends meetings of clubs or organizations: None     Relationship status: None     Intimate partner violence:     Fear of current or ex partner: None     Emotionally abused: None     Physically abused: None     Forced sexual activity: None   Other Topics Concern     None   Social History Narrative     None  "      CURRENT MEDICATIONS:  Current Outpatient Medications   Medication Sig Dispense Refill     Ascorbic Acid (VITAMIN C PO) Take 500 mg by mouth 2 times daily       calcium-vitamin D (CALTRATE) 600-400 MG-UNIT per tablet Take 1 tablet by mouth 2 times daily       loratadine (CLARITIN) 10 MG tablet Take 10 mg by mouth daily as needed       multivitamin, therapeutic with minerals (MULTI-VITAMIN) TABS tablet Take 1 tablet by mouth daily Women's Health Vitamin       HYDROcodone-acetaminophen (NORCO) 5-325 MG per tablet Take 1 tablet by mouth every 6 hours as needed for moderate to severe pain         ROS:   Constitutional: No fever, chills, or sweats. No weight gain/loss.   ENT: No visual disturbance, ear ache, epistaxis, sore throat.   Allergies/Immunologic: Negative.   Respiratory: No cough, hemoptysis.   Cardiovascular: As per HPI.   GI: No nausea, vomiting, hematemesis, melena, or hematochezia.   : No urinary frequency, dysuria, or hematuria.   Integument: Negative.   Psychiatric: Negative.   Neuro: Negative.   Endocrinology: Negative.   Musculoskeletal: Negative.    EXAM:  /74 (BP Location: Right arm, Patient Position: Chair, Cuff Size: Adult Large)   Pulse 64   Ht 1.575 m (5' 2\")   Wt 90.8 kg (200 lb 3.2 oz)   SpO2 97%   BMI 36.62 kg/m     General: appears comfortable, alert and articulate  Head: normocephalic, atraumatic  Eyes: anicteric sclera, EOMI  Neck: no adenopathy  Orophyarynx: moist mucosa, no lesions, dentition intact  Heart: regular, S1/S2, no murmur, gallop, rub, estimated JVP 7 cm  Lungs: clear, no rales or wheezing  Abdomen: soft, non-tender, bowel sounds present, no hepatosplenomegaly  Extremities: no clubbing, cyanosis or edema  Neurological: normal speech and affect, no gross motor deficits    Labs:  CBC RESULTS:  Lab Results   Component Value Date    WBC 4.6 07/24/2017    RBC 4.61 07/24/2017    HGB 14.1 07/24/2017    HCT 43.0 07/24/2017    MCV 93 07/24/2017    MCH 30.6 07/24/2017    " MCHC 32.8 07/24/2017    RDW 13.7 07/24/2017     (L) 07/24/2017       CMP RESULTS:  Lab Results   Component Value Date     07/22/2019    POTASSIUM 3.8 07/22/2019    CHLORIDE 112 (H) 07/22/2019    CO2 24 07/22/2019    ANIONGAP 4 07/22/2019     (H) 07/22/2019    BUN 10 07/22/2019    CR 0.66 07/22/2019    GFRESTIMATED >90 07/22/2019    GFRESTBLACK >90 07/22/2019    SHONA 9.1 07/22/2019        INR RESULTS:  No results found for: INR    No results found for: MAG  No results found for: NTBNPI  No results found for: NTBNP    Assessment and Plan:   Pt with Myotonic Dystrophy Type I with CMR 2017 without evidence of cardiac fibrosis.  Plan for 3day ziopatch today and will repeat CMR next year.  Pt to RTC in one year.     Mick Ball MD, PhD  Professor, Heart Failure and Cardiac Transplantation  [facility]    CC  Patient Care Team:  Mick Ball MD as MD (Cardiology)  Kevin Dumas MD as MD (Pediatric Neurology)  Giulia Park GC as Genetic Counselor (Genetic Counselor, MS)  SELF, REFERRED

## 2019-07-22 NOTE — PROGRESS NOTES
Per Dr. Ball, patient to have 3 day Zio monitor placed.  Diagnosis: Myotonic Dystrophy  Monitor placed: Yes  Patient Instructed: Yes  Patient verbalized understanding: Yes  Holter # [id number]  WP

## 2019-07-22 NOTE — PROGRESS NOTES
[facility]                  Muscular Dystrophy Clinic Note     Reny Christianson MRN# [id number]   YOB: 1990 Age: 29 year old      Date of Visit: Jul 22, 2019    Primary care provider: No primary care provider on file.        History is obtained from the patient, family and medical record       Interval Change:      Reny Christianson is a 29 year old female was seen and examined at the muscular dystrophy clinic on Jul 22, 2019 for evaluation of myotonic dystrophy type I.  She reports no significant change in her course.  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  She does have some hypersomnolence.  She sleeps for about 10 hours.  She takes naps when she is after work.  She denies HA.  She denies vision problems. She has no stomach problems.               Allergies:      Allergies   Allergen Reactions     Seasonal Allergies Itching and Unknown     Sneezing and itchy eyes             Medications:     Prescription Medications as of 7/22/2019       Rx Number Disp Refills Start End Last Dispensed Date Next Fill Date Owning Pharmacy    Ascorbic Acid (VITAMIN C PO)            Sig: Take 500 mg by mouth 2 times daily    Class: Historical    Route: Oral    calcium-vitamin D (CALTRATE) 600-400 MG-UNIT per tablet            Sig: Take 1 tablet by mouth 2 times daily    Class: Historical    Route: Oral    loratadine (CLARITIN) 10 MG tablet    9/29/2014        Sig: Take 10 mg by mouth daily as needed    Class: Historical    Route: Oral    multivitamin, therapeutic with minerals (MULTI-VITAMIN) TABS tablet            Sig: Take 1 tablet by mouth daily Women's Health Vitamin    Class: Historical    Route: Oral                Review of Systems:   The 10 point Review of Systems is negative other than noted in the HPI         Physical Exam:     /80 (BP Location: Right arm, Patient Position: Chair, Cuff Size: Adult  "Regular)   Pulse 63   Temp 98.2  F (36.8  C) (Oral)   Resp 16   Ht 1.6 m (5' 3\")   Wt 89.5 kg (197 lb 6.4 oz)   SpO2 100%   BMI 34.97 kg/m     Physical Exam:   General: NAD  Head: Normocephalic, atraumatic  Eyes: No conjunctival injection, no scleral icterus.  Mouth: No oral lesions, no erythema or exudate in the oropharynx  Respiratory: No increased work of breathing  Cardiovascular: No lower extremity edema  Abdomen: Soft, non-tender, without organomegaly.  Extremities: Warm, dry  Neurologic:   Mental Status Exam: Alert, awake and easily engaged in interaction.   Cranial Nerves: PERRLA, EOMs intact, no nystagmus, facial movements symmetric,                 facial sensation intact to light touch, hearing intact to conversation, palate and uvula               rise symmetrically, no deviation in uvula or tongue, tongue midline and fully mobile                with no atrophy or fasciculations.    Motor:            Manual Motor Exam     Right Left A F  Right Left A F   Shoulder Abduction 4.5 4.5   Hip Flexion 4.5 4.5     Elbow Flexion 5 5   Knee Extension 5 5     Elbow Extension 5 5   Knee Flexion 5 5     Wrist Extension 4 4   Foot Dorsiflexion 5 5     Wrist flexion 5 5   Foot Plantar Flexion 5 5                                                                                                                                                        Reflexes   Right Left  Right Left   Biceps Absent Absent Patellar Absent Absent   Triceps Absent Absent Achilles Absent Absent   Brachioradialis Absent Absent Babinski Absent Absent       Coordination and Gait  Gait 0 Normal   Right Left   Romberg 3 Not tested  Heel 1 Abnormal 1 Abnormal   Tandem 2 Not tested  Toe 0 Normal 0 Normal               Data:     Last Basic Metabolic Panel:  Lab Results   Component Value Date     07/22/2019      Lab Results   Component Value Date    POTASSIUM 3.8 07/22/2019     Lab Results   Component Value Date    CHLORIDE 112 07/22/2019 "     Lab Results   Component Value Date    SHONA 9.1 07/22/2019     Lab Results   Component Value Date    CO2 24 07/22/2019     Lab Results   Component Value Date    BUN 10 07/22/2019     Lab Results   Component Value Date    CR 0.66 07/22/2019     Lab Results   Component Value Date     07/22/2019          Assessment and Recommendations:   Cheryl Christianson is a 28 year old female 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  She has been running relatively stable course with no overt progression over the course of last few years.  Her main deficit consist of mild to moderate cognitive impairment and hypersomnolence.  Her myotonia is not interfering with her function.     Recommendations:  -Continue current treatment.  - Continue follow-up with cardiology as scheduled.  - Return to clinic in 1 year or sooner if necessary     I spent total of 15 minutes in face-to-face during today's visit. Over 50% of this time was spent counseling the patient and coordinating care. See note for details.     Kevin Dumas MD  [phone]         CC  Patient Care Team:  Mick Ball MD as MD (Cardiology)  Kevin Dumas MD as MD (Pediatric Neurology)  SELF, REFERRED    Copy to patient  CHERYL CHRISTIANSON  [address] [facility] [address]

## 2019-07-22 NOTE — PROGRESS NOTES
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  Denies new CP, SOB, COLLINS, orthopnea, PND or bipedal edema, presyncope, syncope, new fatigue and change in exertional capacity.  Works at [employer] for the past 3 yr period.  Denies tobacco abuse hx.      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    SOCIAL HISTORY:  Social History     Socioeconomic History     Marital status: Single     Spouse name: None     Number of children: None     Years of education: None     Highest education level: None   Occupational History     None   Social Needs     Financial resource strain: None     Food insecurity:     Worry: None     Inability: None     Transportation needs:     Medical: None     Non-medical: None   Tobacco Use     Smoking status: Never Smoker     Smokeless tobacco: Never Used   Substance and Sexual Activity     Alcohol use: No     Drug use: None     Sexual activity: None   Lifestyle     Physical activity:     Days per week: None     Minutes per session: None     Stress: None   Relationships     Social connections:     Talks on phone: None     Gets together: None     Attends Religion service: None     Active member of club or organization: None     Attends meetings of clubs or organizations: None     Relationship status: None     Intimate partner violence:     Fear of current or ex partner: None     Emotionally abused: None     Physically abused: None     Forced sexual activity: None   Other Topics Concern     None   Social History Narrative     None       CURRENT MEDICATIONS:  Current Outpatient Medications   Medication Sig Dispense Refill     Ascorbic Acid (VITAMIN C PO) Take 500 mg by mouth 2 times daily       calcium-vitamin D (CALTRATE) 600-400 MG-UNIT per tablet Take 1 tablet by mouth 2 times daily       loratadine (CLARITIN) 10 MG tablet Take 10 mg by mouth  "daily as needed       multivitamin, therapeutic with minerals (MULTI-VITAMIN) TABS tablet Take 1 tablet by mouth daily Women's Health Vitamin       HYDROcodone-acetaminophen (NORCO) 5-325 MG per tablet Take 1 tablet by mouth every 6 hours as needed for moderate to severe pain         ROS:   Constitutional: No fever, chills, or sweats. No weight gain/loss.   ENT: No visual disturbance, ear ache, epistaxis, sore throat.   Allergies/Immunologic: Negative.   Respiratory: No cough, hemoptysis.   Cardiovascular: As per HPI.   GI: No nausea, vomiting, hematemesis, melena, or hematochezia.   : No urinary frequency, dysuria, or hematuria.   Integument: Negative.   Psychiatric: Negative.   Neuro: Negative.   Endocrinology: Negative.   Musculoskeletal: Negative.    EXAM:  /74 (BP Location: Right arm, Patient Position: Chair, Cuff Size: Adult Large)   Pulse 64   Ht 1.575 m (5' 2\")   Wt 90.8 kg (200 lb 3.2 oz)   SpO2 97%   BMI 36.62 kg/m    General: appears comfortable, alert and articulate  Head: normocephalic, atraumatic  Eyes: anicteric sclera, EOMI  Neck: no adenopathy  Orophyarynx: moist mucosa, no lesions, dentition intact  Heart: regular, S1/S2, no murmur, gallop, rub, estimated JVP 7 cm  Lungs: clear, no rales or wheezing  Abdomen: soft, non-tender, bowel sounds present, no hepatosplenomegaly  Extremities: no clubbing, cyanosis or edema  Neurological: normal speech and affect, no gross motor deficits    Labs:  CBC RESULTS:  Lab Results   Component Value Date    WBC 4.6 07/24/2017    RBC 4.61 07/24/2017    HGB 14.1 07/24/2017    HCT 43.0 07/24/2017    MCV 93 07/24/2017    MCH 30.6 07/24/2017    MCHC 32.8 07/24/2017    RDW 13.7 07/24/2017     (L) 07/24/2017       CMP RESULTS:  Lab Results   Component Value Date     07/22/2019    POTASSIUM 3.8 07/22/2019    CHLORIDE 112 (H) 07/22/2019    CO2 24 07/22/2019    ANIONGAP 4 07/22/2019     (H) 07/22/2019    BUN 10 07/22/2019    CR 0.66 07/22/2019 "    GFRESTIMATED >90 07/22/2019    GFRESTBLACK >90 07/22/2019    SHONA 9.1 07/22/2019        INR RESULTS:  No results found for: INR    No results found for: MAG  No results found for: NTBNPI  No results found for: NTBNP    Assessment and Plan:   Pt with Myotonic Dystrophy Type I with CMR 2017 without evidence of cardiac fibrosis.  Plan for 3day ziopatch today and will repeat CMR next year.  Pt to RTC in one year.     Mick Ball MD, PhD  Professor, Heart Failure and Cardiac Transplantation  [facility]    CC  Patient Care Team:  Mick Ball MD as MD (Cardiology)  Kevin Dumas MD as MD (Pediatric Neurology)  Giulia Park GC as Genetic Counselor (Genetic Counselor, MS)  SELF, REFERRED

## 2019-07-23 LAB — INTERPRETATION ECG - MUSE: NORMAL

## 2019-08-05 ENCOUNTER — TELEPHONE (OUTPATIENT)
Dept: CARDIOLOGY | Facility: CLINIC | Age: 29
End: 2019-08-05

## 2019-08-05 NOTE — TELEPHONE ENCOUNTER
Hilary received the zio patch and there is no data on it at all. A message was sent to Dr. Ball's nurse.     Beverly Manjarrez  Periop Electrophysiology   [phone]

## 2020-03-10 ENCOUNTER — HEALTH MAINTENANCE LETTER (OUTPATIENT)
Age: 30
End: 2020-03-10

## 2020-06-03 DIAGNOSIS — R00.2 PALPITATIONS: ICD-10-CM

## 2020-06-03 DIAGNOSIS — G71.11 MYOTONIC DYSTROPHY, TYPE 1 (H): Primary | ICD-10-CM

## 2020-06-15 ENCOUNTER — PATIENT OUTREACH (OUTPATIENT)
Dept: CARDIOLOGY | Facility: CLINIC | Age: 30
End: 2020-06-15

## 2020-06-15 NOTE — TELEPHONE ENCOUNTER
Talked with Danna about changing appt with Dr. Ball on 6/22/20 to a telephone appt.  Requested to have BMP drawn prior to appt. Pt will go to [place] lab in [place]. Orders faxed and requested results sent back for appt.    Corinna Crouch RN

## 2020-06-18 LAB
ANION GAP SERPL CALCULATED.3IONS-SCNC: 10 MMOL/L
BUN SERPL-MCNC: 10 MG/DL
CALCIUM SERPL-MCNC: 10 MG/DL
CHLORIDE SERPLBLD-SCNC: 109 MMOL/L
CO2 SERPL-SCNC: 26 MMOL/L
CREAT SERPL-MCNC: 0.5 MG/DL
GLUCOSE SERPL-MCNC: 82 MG/DL (ref 70–99)
POTASSIUM SERPL-SCNC: 4.3 MMOL/L
SODIUM SERPL-SCNC: 141 MMOL/L

## 2020-06-18 ASSESSMENT — ENCOUNTER SYMPTOMS
BACK PAIN: 1
MYALGIAS: 1
STIFFNESS: 0
JOINT SWELLING: 0
NECK PAIN: 0
MUSCLE WEAKNESS: 0
MUSCLE CRAMPS: 1
ARTHRALGIAS: 1

## 2020-06-22 ENCOUNTER — VIRTUAL VISIT (OUTPATIENT)
Dept: CARDIOLOGY | Facility: CLINIC | Age: 30
End: 2020-06-22
Attending: INTERNAL MEDICINE
Payer: MEDICARE

## 2020-06-22 ENCOUNTER — VIRTUAL VISIT (OUTPATIENT)
Dept: NEUROLOGY | Facility: CLINIC | Age: 30
End: 2020-06-22
Payer: MEDICARE

## 2020-06-22 VITALS — BODY MASS INDEX: 27.81 KG/M2 | WEIGHT: 157 LBS

## 2020-06-22 DIAGNOSIS — G71.11 MYOTONIC DYSTROPHY, TYPE 1 (H): Primary | ICD-10-CM

## 2020-06-22 DIAGNOSIS — R53.83 FATIGUE, UNSPECIFIED TYPE: Primary | ICD-10-CM

## 2020-06-22 DIAGNOSIS — Z13.6 SCREENING FOR CARDIOVASCULAR CONDITION: ICD-10-CM

## 2020-06-22 DIAGNOSIS — I49.3 VENTRICULAR ECTOPY: ICD-10-CM

## 2020-06-22 DIAGNOSIS — G71.11 MYOTONIC DYSTROPHY, TYPE 1 (H): ICD-10-CM

## 2020-06-22 PROCEDURE — 99443 ZZC PHYSICIAN TELEPHONE EVALUATION 21-30 MIN: CPT | Performed by: INTERNAL MEDICINE

## 2020-06-22 RX ORDER — DOCUSATE SODIUM 100 MG/1
100 CAPSULE, LIQUID FILLED ORAL DAILY
COMMUNITY

## 2020-06-22 ASSESSMENT — PAIN SCALES - GENERAL: PAINLEVEL: NO PAIN (0)

## 2020-06-22 NOTE — LETTER
6/22/2020       RE: Reny Christianson  [address] [facility] [address]     Dear Colleague,    Thank you for referring your patient, Reny Christianson, to the [facility] NEUROLOGY at [facility]. Please see a copy of my visit note below.    Reny Christianson is a 30 year old female who is being evaluated via a billable telephone visit.        Phone call duration: 4 minutes    Araceli Talbot CMA  Patient Care Supervisor  Endocrinology & Diabetes   Neurology, Neurosurgery, PM&R          Answers for HPI/ROS submitted by the patient on 6/18/2020   General Symptoms: No  Skin Symptoms: No  HENT Symptoms: No  EYE SYMPTOMS: No  HEART SYMPTOMS: No  LUNG SYMPTOMS: No  INTESTINAL SYMPTOMS: No  URINARY SYMPTOMS: No  GYNECOLOGIC SYMPTOMS: No  BREAST SYMPTOMS: No  SKELETAL SYMPTOMS: Yes  BLOOD SYMPTOMS: No  NERVOUS SYSTEM SYMPTOMS: No  MENTAL HEALTH SYMPTOMS: No  Back pain: Yes  Muscle aches: Yes  Neck pain: No  Swollen joints: No  Joint pain: Yes  Bone pain: No  Muscle cramps: Yes  Muscle weakness: No  Joint stiffness: No  Bone fracture: No                   [facility]                  Muscular Dystrophy Clinic Telephone Note    Reny Christianson MRN# [id number]   YOB: 1990 Age: 30 year old      Date of Visit: Jun 22, 2020    Primary care provider: [facility]          History is obtained from the patient, family and medical record       Interval Change:      Reny Christianson is a 30 year old female was seen and examined at the muscular dystrophy clinic on Jun 22, 2020 for evaluation of myotonic dystrophy type 1. She reports no significant worsening since her previous visit. She did receive a physical therapy and started wearing SMO bilaterally. She has no specific issues or concerns. She does not fall often. Last time it was about 2 months. She did not fall since she started wearing orthotics.  She is sleeping well. She does not  take naps. She works 25 hours a week. She goes to bed at 9:30 pm and wakes up at 7:30 am.  She has good restful sleep. She denies any significant symptoms. She does have constipation for which she takes stool softeners.   She was seen by Dr. Ball. He suggested no new interventions today. She denies any respiratory difficulties.  She denieschewing and swallowing difficulties. Her weight is going down which is intentional. She does use a diet. She does get occasional pain in her knees and ankles. She is not doing PT right now. She has a  in the local hospital.              Allergies:      Allergies   Allergen Reactions     Seasonal Allergies Itching and Unknown     Sneezing and itchy eyes             Medications:     Prescription Medications as of 6/22/2020       Rx Number Disp Refills Start End Last Dispensed Date Next Fill Date Owning Pharmacy    calcium-vitamin D (CALTRATE) 600-400 MG-UNIT per tablet            Sig: Take 1 tablet by mouth 2 times daily    Class: Historical    Route: Oral    docusate sodium (COLACE) 100 MG capsule        [facility] [id number] - [place] [id number]    Sig: Take 100 mg by mouth daily    Class: Historical    Route: Oral    loratadine (CLARITIN) 10 MG tablet    9/29/2014        Sig: Take 10 mg by mouth daily as needed    Class: Historical    Route: Oral    multivitamin, therapeutic with minerals (MULTI-VITAMIN) TABS tablet            Sig: Take 1 tablet by mouth daily Women's Health Vitamin    Class: Historical    Route: Oral    Ascorbic Acid (VITAMIN C PO)            Sig: Take 500 mg by mouth daily     Class: Historical    Route: Oral                Review of Systems:     Answers for HPI/ROS submitted by the patient on 6/18/2020   General Symptoms: No  Skin Symptoms: No  HENT Symptoms: No  EYE SYMPTOMS: No  HEART SYMPTOMS: No  LUNG SYMPTOMS: No  INTESTINAL SYMPTOMS: No  URINARY SYMPTOMS: No  GYNECOLOGIC SYMPTOMS: No  BREAST SYMPTOMS: No  SKELETAL  SYMPTOMS: Yes  BLOOD SYMPTOMS: No  NERVOUS SYSTEM SYMPTOMS: No  MENTAL HEALTH SYMPTOMS: No  Back pain: Yes  Muscle aches: Yes  Neck pain: No  Swollen joints: No  Joint pain: Yes  Bone pain: No  Muscle cramps: Yes  Muscle weakness: No  Joint stiffness: No  Bone fracture: No         Data:   CBC:  Lab Results   Component Value Date    WBC 4.6 07/24/2017     Lab Results   Component Value Date    RBC 4.61 07/24/2017     Lab Results   Component Value Date    HGB 14.1 07/24/2017     Lab Results   Component Value Date    HCT 43.0 07/24/2017     No components found for: MCT  Lab Results   Component Value Date    MCV 93 07/24/2017     Lab Results   Component Value Date    MCH 30.6 07/24/2017     Lab Results   Component Value Date    MCHC 32.8 07/24/2017     Lab Results   Component Value Date    RDW 13.7 07/24/2017     Lab Results   Component Value Date     07/24/2017       Last Basic Metabolic Panel:  Lab Results   Component Value Date     06/18/2020      Lab Results   Component Value Date    POTASSIUM 4.3 06/18/2020     Lab Results   Component Value Date    CHLORIDE 109 06/18/2020     Lab Results   Component Value Date    SHONA 10.0 06/18/2020     Lab Results   Component Value Date    CO2 26 06/18/2020     Lab Results   Component Value Date    BUN 10 06/18/2020     Lab Results   Component Value Date    CR 0.5 06/18/2020     Lab Results   Component Value Date    GLC 82 06/18/2020          Assessment and Recommendations:   Reny Christianson is a 30 year old female 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  She has been running relatively stable course with no overt progression over the course of last few years.  Her main deficit consist of mild to moderate cognitive impairment and hypersomnolence.  Her myotonia is not interfering with her function.    Recommendations:  - Continue current treatment course.  -Continue with current exercise and diet program.   -Return to clinic in 1 year.    Reny K  Sammy is a 30 year old female who is being evaluated via a billable telephone visit.        Start: 1:38 pm  End: 2:00 pm    Phone call duration: 22 minutes       Consent has been obtained for this service by care team member: Yes     I spent total of 22 minutes in telephone visit..    Kevin Dumas MD  [phone]         Patient Care Team:  [facility] MD as PCP - General  [facility] ([facility])  Kevin Dumas MD as MD (Pediatric Neurology)  Corinna Crouch, PATTY as Specialty Care Coordinator (Cardiology)  SELF, REFERRED    Copy to patient  CHERYL SPARKS  [facility] [address]

## 2020-06-22 NOTE — PROGRESS NOTES
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 She reports no significant worsening since her previous visit. She did receive a physical therapy and started wearing SMO bilaterally. She has no specific issues or concerns. She does not fall often. Last time it was about 2 months. She did not fall since she started wearing orthotics.  She is sleeping well. She does not take naps. She works 25 hours a week. She goes to bed at 9:30 pm and wakes up at 7:30 am.  She has good restful sleep. She denies any significant symptoms. She does have constipation for which she takes stool softeners.   She was seen by Dr. Ball. He suggested no new interventions today. She denies any respiratory difficulties.  She denieschewing and swallowing difficulties. Her weight is going down which is intentional. She does use a diet. She does get occasional pain in her knees and ankles. She is not doing PT right now. She has a  in the local hospital.              Allergies:      Allergies   Allergen Reactions     Seasonal Allergies Itching and Unknown     Sneezing and itchy eyes             Medications:     Prescription Medications as of 6/22/2020       Rx Number Disp Refills Start End Last Dispensed Date Next Fill Date Owning Pharmacy    calcium-vitamin D (CALTRATE) 600-400 MG-UNIT per tablet            Sig: Take 1 tablet by mouth 2 times daily    Class: Historical    Route: Oral    docusate sodium (COLACE) 100 MG capsule        [facility]  [id number] - [place], [place] - [address]    Sig: Take 100 mg by mouth daily    Class: Historical    Route: Oral    loratadine (CLARITIN) 10 MG tablet    9/29/2014        Sig: Take 10 mg by mouth daily as needed    Class: Historical    Route: Oral    multivitamin, therapeutic with minerals (MULTI-VITAMIN) TABS tablet            Sig: Take 1 tablet by mouth daily Women's Health Vitamin    Class: Historical    Route: Oral    Ascorbic Acid (VITAMIN C PO)            Sig: Take 500 mg by mouth daily     Class: Historical    Route: Oral                Review of Systems:     Answers for HPI/ROS submitted by the patient on 6/18/2020   General Symptoms: No  Skin Symptoms: No  HENT Symptoms: No  EYE SYMPTOMS: No  HEART SYMPTOMS: No  LUNG SYMPTOMS: No  INTESTINAL SYMPTOMS: No  URINARY SYMPTOMS: No  GYNECOLOGIC SYMPTOMS: No  BREAST SYMPTOMS: No  SKELETAL SYMPTOMS: Yes  BLOOD SYMPTOMS: No  NERVOUS SYSTEM SYMPTOMS: No  MENTAL HEALTH SYMPTOMS: No  Back pain: Yes  Muscle aches: Yes  Neck pain: No  Swollen joints: No  Joint pain: Yes  Bone pain: No  Muscle cramps: Yes  Muscle weakness: No  Joint stiffness: No  Bone fracture: No         Data:   CBC:  Lab Results   Component Value Date    WBC 4.6 07/24/2017     Lab Results   Component Value Date    RBC 4.61 07/24/2017     Lab Results   Component Value Date    HGB 14.1 07/24/2017     Lab Results   Component Value Date    HCT 43.0 07/24/2017     No components found for: MCT  Lab Results   Component Value Date    MCV 93 07/24/2017     Lab Results   Component Value Date    MCH 30.6 07/24/2017     Lab Results   Component Value Date    MCHC 32.8 07/24/2017     Lab Results   Component Value Date    RDW 13.7 07/24/2017     Lab Results   Component Value Date     07/24/2017       Last Basic Metabolic Panel:  Lab Results   Component Value Date     06/18/2020      Lab Results   Component Value Date    POTASSIUM 4.3 06/18/2020     Lab Results   Component Value Date    CHLORIDE 109  "06/18/2020     Lab Results   Component Value Date    SHONA 10.0 06/18/2020     Lab Results   Component Value Date    CO2 26 06/18/2020     Lab Results   Component Value Date    BUN 10 06/18/2020     Lab Results   Component Value Date    CR 0.5 06/18/2020     Lab Results   Component Value Date    GLC 82 06/18/2020          Assessment and Recommendations:   Reny Christianson is a 30 year old female 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  She has been running relatively stable course with no overt progression over the course of last few years.  Her main deficit consist of mild to moderate cognitive impairment and hypersomnolence.  Her myotonia is not interfering with her function.    Recommendations:  - Continue current treatment course.  -Continue with current exercise and diet program.   -Return to clinic in 1 year.    Reny Christianson is a 30 year old female who is being evaluated via a billable telephone visit.      The patient has been notified of following:     \"This telephone visit will be conducted via a call between you and your physician/provider. We have found that certain health care needs can be provided without the need for a physical exam.  This service lets us provide the care you need with a short phone conversation.  If a prescription is necessary we can send it directly to your pharmacy.  If lab work is needed we can place an order for that and you can then stop by our lab to have the test done at a later time.    Telephone visits are billed at different rates depending on your insurance coverage. During this emergency period, for some insurers they may be billed the same as an in-person visit.  Please reach out to your insurance provider with any questions.    If during the course of the call the physician/provider feels a telephone visit is not appropriate, you will not be charged for this service.\"    Patient has given verbal consent for Telephone visit?  Yes    Start: 1:38 " pm  End: 2:00 pm    Phone call duration: 22 minutes       Consent has been obtained for this service by care team member: Yes     I spent total of 22 minutes in telephone visit..    Kevin Dumas MD  [phone]         Patient Care Team:  [facility] MD as PCP - General  [facility], Mick Gongora MD as MD (Cardiology)  Kevin Dumas MD as MD (Pediatric Neurology)  Corinna Crouch, RN as Specialty Care Coordinator (Cardiology)  SELF, REFERRED    Copy to patient  CEHRYL SPARKS  [address] [place] [id number]

## 2020-06-22 NOTE — PATIENT INSTRUCTIONS
"You were seen today in the Cardiovascular Clinic at the [facility].      Cardiology Providers you saw during your visit:       Medication Changes:   None     Patient Instructions:    We will work on scheduling you a Cardiac MRI.   We will mail out a Ziopatch monitor to you to wear for 2 days.      Follow up Appointment Information:     Follow up in one year with Dr Ball.         Thank you for allowing us to be a part of your care here at the [facility]     If you have questions or concerns please contact us at:     Cardiology Clinic   Questions and [phone]  First press #1 for the [place] and then press #3 for \"Medical Advise\" to reach a Cardiology Nurse.                                                                                ** Please note that you will NOT receive a reminder call regarding your scheduled testing, reminder calls are for provider appointments only.  If you are scheduled for testing within the [facility] system you may receive a call regarding pre-registration for billing purposes only.**      "

## 2020-06-22 NOTE — PROGRESS NOTES
"Reny Christianson is a 30 year old female who is being evaluated via a billable telephone visit.      The patient has been notified of following:     \"This telephone visit will be conducted via a call between you and your physician/provider. We have found that certain health care needs can be provided without the need for a physical exam.  This service lets us provide the care you need with a short phone conversation.  If a prescription is necessary we can send it directly to your pharmacy.  If lab work is needed we can place an order for that and you can then stop by our lab to have the test done at a later time.    Telephone visits are billed at different rates depending on your insurance coverage. During this emergency period, for some insurers they may be billed the same as an in-person visit.  Please reach out to your insurance provider with any questions.    If during the course of the call the physician/provider feels a telephone visit is not appropriate, you will not be charged for this service.\"    Patient has given verbal consent for Telephone visit?  Yes    What phone number would you like to be contacted at? [phone]    How would you like [facility] your [facility]? [facility]    Phone call duration: 4 minutes    Araceli Talbot CMA  Patient Care Supervisor  Endocrinology & Diabetes   Neurology, Neurosurgery, PM&R          Answers for HPI/ROS submitted by the patient on 6/18/2020   General Symptoms: No  Skin Symptoms: No  HENT Symptoms: No  EYE SYMPTOMS: No  HEART SYMPTOMS: No  LUNG SYMPTOMS: No  INTESTINAL SYMPTOMS: No  URINARY SYMPTOMS: No  GYNECOLOGIC SYMPTOMS: No  BREAST SYMPTOMS: No  SKELETAL SYMPTOMS: Yes  BLOOD SYMPTOMS: No  NERVOUS SYSTEM SYMPTOMS: No  MENTAL HEALTH SYMPTOMS: No  Back pain: Yes  Muscle aches: Yes  Neck pain: No  Swollen joints: No  Joint pain: Yes  Bone pain: No  Muscle cramps: Yes  Muscle weakness: No  Joint stiffness: No  Bone fracture: No    "

## 2020-06-22 NOTE — LETTER
6/22/2020      RE: Reny Christianson  [address] [address]       Dear Colleague,    Thank you for the opportunity to participate in the care of your patient, Reny Christianson, at the [facility]. Please see a copy of my visit note below.    Reny Christianson is a 30 year old female who is being evaluated via a billable telephone visit.        HPI: 31 yo WF with hx of myotonic dystrophy Type I with telephone visit with mother on the phone as well.  Pt denies new SOB, COLLINS, orthopnea, PND, bipedal edema, presyncope, syncope, fevers, chills, NS or exposure to COVID-19.  Pt states that she is continuing to work at her job with precautions.      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    SOCIAL HISTORY:  Social History     Socioeconomic History     Marital status: Single     Spouse name: None     Number of children: None     Years of education: None     Highest education level: None   Occupational History     None   Social Needs     Financial resource strain: None     Food insecurity     Worry: None     Inability: None     Transportation needs     Medical: None     Non-medical: None   Tobacco Use     Smoking status: Never Smoker     Smokeless tobacco: Never Used   Substance and Sexual Activity     Alcohol use: No     Drug use: None     Sexual activity: None   Lifestyle     Physical activity     Days per week: None     Minutes per session: None     Stress: None   Relationships     Social connections     Talks on phone: None     Gets together: None     Attends Judaism service: None     Active member of club or organization: None     Attends meetings of clubs or organizations: None     Relationship status: None     Intimate partner violence     Fear of current or ex partner: None     Emotionally abused: None     Physically  abused: None     Forced sexual activity: None   Other Topics Concern     None   Social History Narrative     None       CURRENT MEDICATIONS:  Current Outpatient Medications   Medication Sig Dispense Refill     calcium-vitamin D (CALTRATE) 600-400 MG-UNIT per tablet Take 1 tablet by mouth 2 times daily       docusate sodium (COLACE) 100 MG capsule Take 100 mg by mouth daily       loratadine (CLARITIN) 10 MG tablet Take 10 mg by mouth daily as needed       multivitamin, therapeutic with minerals (MULTI-VITAMIN) TABS tablet Take 1 tablet by mouth daily Women's Health Vitamin       Ascorbic Acid (VITAMIN C PO) Take 500 mg by mouth daily          ROS:   Constitutional: No fever, chills, or sweats. No weight gain/loss.   ENT: No visual disturbance, ear ache, epistaxis, sore throat.   Allergies/Immunologic: Negative.   Respiratory: No cough, hemoptysis.   Cardiovascular: As per HPI.   GI: No nausea, vomiting, hematemesis, melena, or hematochezia.   : No urinary frequency, dysuria, or hematuria.   Integument: Negative.   Psychiatric: Negative.   Neuro: Negative.   Endocrinology: Negative.   Musculoskeletal: Negative.    EXAM:  Wt 71.2 kg (157 lb)   BMI 27.81 kg/m    Pt with no new complaints or distress.       Labs:  CBC RESULTS:  Lab Results   Component Value Date    WBC 4.6 07/24/2017    RBC 4.61 07/24/2017    HGB 14.1 07/24/2017    HCT 43.0 07/24/2017    MCV 93 07/24/2017    MCH 30.6 07/24/2017    MCHC 32.8 07/24/2017    RDW 13.7 07/24/2017     (L) 07/24/2017       CMP RESULTS:  Lab Results   Component Value Date     06/18/2020    POTASSIUM 4.3 06/18/2020    CHLORIDE 109 06/18/2020    CO2 26 06/18/2020    ANIONGAP 10 06/18/2020    GLC 82 06/18/2020    BUN 10 06/18/2020    CR 0.5 06/18/2020    GFRESTIMATED >90 07/22/2019    GFRESTBLACK >90 07/22/2019    SHONA 10.0 06/18/2020        INR RESULTS:  No results found for: INR    No results found for: MAG  No results found for: NTBNPI  No results found for:  NTBNP    Assessment and Plan:   Pt at high risk for dystrophic cardiomyopthy with myotonic dystrophy Type I.  Plan for repeat CMR and 2d ziopatch once able to travel to .  Will see pt back in one year.     Mick Ball MD, PhD  Professor, Heart Failure and Cardiac Transplantation  [facility]    CC  Patient Care Team:  [facility] MD as PCP - General  [facility], Mick Gongora MD as MD (Cardiology)  Kevin Dumas MD as MD (Pediatric Neurology)  Corinna Crouch RN as Specialty Care Coordinator (Cardiology)  SELF, REFERRED      Please do not hesitate to contact me if you have any questions/concerns.     Sincerely,     Mick Ball MD

## 2020-06-22 NOTE — PROGRESS NOTES
"Reny Christianson is a 30 year old female who is being evaluated via a billable telephone visit.      The patient has been notified of following:     \"This telephone visit will be conducted via a call between you and your physician/provider. We have found that certain health care needs can be provided without the need for a physical exam.  This service lets us provide the care you need with a short phone conversation.  If a prescription is necessary we can send it directly to your pharmacy.  If lab work is needed we can place an order for that and you can then stop by our lab to have the test done at a later time.    Telephone visits are billed at different rates depending on your insurance coverage. During this emergency period, for some insurers they may be billed the same as an in-person visit.  Please reach out to your insurance provider with any questions.    If during the course of the call the physician/provider feels a telephone visit is not appropriate, you will not be charged for this service.\"    Patient has given verbal consent for Telephone visit?  Yes    What phone number would you like to be contacted at? [phone]    How would you like to obtain your AVS? [place]  HPI: 29 yo WF with hx of myotonic dystrophy Type I with telephone visit with mother on the phone as well.  Pt denies new SOB, COLLINS, orthopnea, PND, bipedal edema, presyncope, syncope, fevers, chills, NS or exposure to COVID-19.  Pt states that she is continuing to work at her job with precautions.      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    SOCIAL HISTORY:  Social History     Socioeconomic History     Marital status: Single     Spouse name: None     Number of children: None     Years of education: None     Highest education level: None   Occupational History     None   Social Needs     Financial resource strain: None "     Food insecurity     Worry: None     Inability: None     Transportation needs     Medical: None     Non-medical: None   Tobacco Use     Smoking status: Never Smoker     Smokeless tobacco: Never Used   Substance and Sexual Activity     Alcohol use: No     Drug use: None     Sexual activity: None   Lifestyle     Physical activity     Days per week: None     Minutes per session: None     Stress: None   Relationships     Social connections     Talks on phone: None     Gets together: None     Attends Zoroastrianism service: None     Active member of club or organization: None     Attends meetings of clubs or organizations: None     Relationship status: None     Intimate partner violence     Fear of current or ex partner: None     Emotionally abused: None     Physically abused: None     Forced sexual activity: None   Other Topics Concern     None   Social History Narrative     None       CURRENT MEDICATIONS:  Current Outpatient Medications   Medication Sig Dispense Refill     calcium-vitamin D (CALTRATE) 600-400 MG-UNIT per tablet Take 1 tablet by mouth 2 times daily       docusate sodium (COLACE) 100 MG capsule Take 100 mg by mouth daily       loratadine (CLARITIN) 10 MG tablet Take 10 mg by mouth daily as needed       multivitamin, therapeutic with minerals (MULTI-VITAMIN) TABS tablet Take 1 tablet by mouth daily Women's Health Vitamin       Ascorbic Acid (VITAMIN C PO) Take 500 mg by mouth daily          ROS:   Constitutional: No fever, chills, or sweats. No weight gain/loss.   ENT: No visual disturbance, ear ache, epistaxis, sore throat.   Allergies/Immunologic: Negative.   Respiratory: No cough, hemoptysis.   Cardiovascular: As per HPI.   GI: No nausea, vomiting, hematemesis, melena, or hematochezia.   : No urinary frequency, dysuria, or hematuria.   Integument: Negative.   Psychiatric: Negative.   Neuro: Negative.   Endocrinology: Negative.   Musculoskeletal: Negative.    EXAM:  Wt 71.2 kg (157 lb)   BMI 27.81  kg/m    Pt with no new complaints or distress.       Labs:  CBC RESULTS:  Lab Results   Component Value Date    WBC 4.6 07/24/2017    RBC 4.61 07/24/2017    HGB 14.1 07/24/2017    HCT 43.0 07/24/2017    MCV 93 07/24/2017    MCH 30.6 07/24/2017    MCHC 32.8 07/24/2017    RDW 13.7 07/24/2017     (L) 07/24/2017       CMP RESULTS:  Lab Results   Component Value Date     06/18/2020    POTASSIUM 4.3 06/18/2020    CHLORIDE 109 06/18/2020    CO2 26 06/18/2020    ANIONGAP 10 06/18/2020    GLC 82 06/18/2020    BUN 10 06/18/2020    CR 0.5 06/18/2020    GFRESTIMATED >90 07/22/2019    GFRESTBLACK >90 07/22/2019    SHONA 10.0 06/18/2020        INR RESULTS:  No results found for: INR    No results found for: MAG  No results found for: NTBNPI  No results found for: NTBNP    Assessment and Plan:   Pt at high risk for dystrophic cardiomyopthy with myotonic dystrophy Type I.  Plan for repeat CMR and 2d ziopatch once able to travel to .  Will see pt back in one year.     Mick Ball MD, PhD  Professor, Heart Failure and Cardiac Transplantation  [facility]    CC  Patient Care Team:  M[facility] I[facility]ra MD as PCP - General  Mick Ball MD as MD (Cardiology)  Kevin Dumas MD as MD (Pediatric Neurology)  Corinna Crouch, RN as Specialty Care Coordinator (Cardiology)  SELF, REFERRED

## 2020-06-24 DIAGNOSIS — G71.11 MYOTONIC DYSTROPHY, TYPE 1 (H): Primary | ICD-10-CM

## 2020-06-24 DIAGNOSIS — Z13.6 SCREENING FOR CARDIOVASCULAR CONDITION: ICD-10-CM

## 2020-06-25 ENCOUNTER — ALLIED HEALTH/NURSE VISIT (OUTPATIENT)
Dept: CARDIOLOGY | Facility: CLINIC | Age: 30
End: 2020-06-25
Attending: INTERNAL MEDICINE
Payer: MEDICARE

## 2020-06-25 DIAGNOSIS — Z13.6 SCREENING FOR CARDIOVASCULAR CONDITION: ICD-10-CM

## 2020-06-26 PROCEDURE — 0298T ZZC EXT ECG > 48HR TO 21 DAY REVIEW AND INTERPRETATN: CPT | Mod: ZP | Performed by: INTERNAL MEDICINE

## 2020-12-27 ENCOUNTER — HEALTH MAINTENANCE LETTER (OUTPATIENT)
Age: 30
End: 2020-12-27

## 2021-04-24 ENCOUNTER — HEALTH MAINTENANCE LETTER (OUTPATIENT)
Age: 31
End: 2021-04-24

## 2021-10-09 ENCOUNTER — HEALTH MAINTENANCE LETTER (OUTPATIENT)
Age: 31
End: 2021-10-09

## 2022-05-21 ENCOUNTER — HEALTH MAINTENANCE LETTER (OUTPATIENT)
Age: 32
End: 2022-05-21

## 2022-09-11 ENCOUNTER — HEALTH MAINTENANCE LETTER (OUTPATIENT)
Age: 32
End: 2022-09-11

## 2023-06-03 ENCOUNTER — HEALTH MAINTENANCE LETTER (OUTPATIENT)
Age: 33
End: 2023-06-03